# Patient Record
Sex: FEMALE | Race: WHITE | Employment: FULL TIME | URBAN - METROPOLITAN AREA
[De-identification: names, ages, dates, MRNs, and addresses within clinical notes are randomized per-mention and may not be internally consistent; named-entity substitution may affect disease eponyms.]

---

## 2017-02-22 PROBLEM — R32 INCONTINENCE: Status: ACTIVE | Noted: 2017-02-22

## 2017-02-22 PROBLEM — R30.0 DYSURIA: Status: ACTIVE | Noted: 2017-02-22

## 2017-05-26 ENCOUNTER — HOSPITAL ENCOUNTER (OUTPATIENT)
Dept: PHYSICAL THERAPY | Age: 57
Discharge: HOME | End: 2017-05-26
Attending: UROLOGY
Payer: COMMERCIAL

## 2017-05-26 DIAGNOSIS — N39.3 SUI (STRESS URINARY INCONTINENCE, FEMALE): ICD-10-CM

## 2017-05-26 PROCEDURE — 97161 PT EVAL LOW COMPLEX 20 MIN: CPT

## 2017-05-26 PROCEDURE — G8987 SELF CARE CURRENT STATUS: HCPCS

## 2017-05-26 PROCEDURE — G8988 SELF CARE GOAL STATUS: HCPCS

## 2017-05-26 NOTE — PROGRESS NOTES
Ambulatory/Rehab Services H2 Model Falls Risk Assessment    Risk Factor Pts. ·   Confusion/Disorientation/Impulsivity  []    4 ·   Symptomatic Depression  []   2 ·   Altered Elimination  []   1 ·   Dizziness/Vertigo  []   1 ·   Gender (Male)  []   1 ·   Any administered antiepileptics (anticonvulsants):  []   2 ·   Any administered benzodiazepines:  []   1 ·   Visual Impairment (specify):  []   1 ·   Portable Oxygen Use  []   1 ·   Orthostatic ? BP  []   1 ·   History of Recent Falls (within 3 mos.)  []   5     Ability to Rise from Chair (choose one) Pts. ·   Ability to rise in a single movement  [x]   0 ·   Pushes up, successful in one attempt  []   1 ·   Multiple attempts, but successful  []   3 ·   Unable to rise without assistance  []   4   Total: (5 or greater = High Risk) 0     Falls Prevention Plan:   []                Physical Limitations to Exercise (specify):   []                Mobility Assistance Device (type):   []                Exercise/Equipment Adaptation (specify):    ©2010 [facility] [address] Patent #[id number].  Federal Law prohibits the replication, distribution or use without written permission from [facility] [employer]

## 2017-05-26 NOTE — PROGRESS NOTES
Nicky Officer  : 1960 [facility] at [address], [address] [place] [address],[address], [facility] [address].  [phone]   [phone]          OUTPATIENT PHYSICAL THERAPY:Initial Assessment 2017    ICD-10: Treatment Diagnosis: Stress Incontinence (N39.3)  Precautions/Allergies:   Review of patient's allergies indicates no known allergies. Fall Risk Score: 0 (? 5 = High Risk)  MD Orders: Evaluate and treat MEDICAL/REFERRING DIAGNOSIS:  JONATHAN (stress urinary incontinence, female) [N39.3]   DATE OF ONSET: years but worsened within past 6 mos  REFERRING PHYSICIAN: Norma Beck MD  RETURN PHYSICIAN APPOINTMENT: unknown     INITIAL ASSESSMENT:  Ms. Meeta Valera presents with decreased strength, endurance, and coordination of PFmm as noted with MMT. Uterine wall laxity likely contributing to active insufficiency of PFmm and ultimately decreased strength. She initially compensated with glut accessory muscles but much improved end of session. Pt will benefit from strengthening and functional training to address stated problems. Plan of care was discussed and agreed upon with patient and HEP was initiated. Thank you for the opportunity to work with this patient. PROBLEM LIST (Impacting functional limitations):  1. Decreased Strength  2. Decreased strength of pelvic floor which limits bladder control INTERVENTIONS PLANNED:  1. Neuromuscular re-education  2. Biofeedback as needed  3. HEP  4. Bladder retraining  5. Bladder education  6. Manual Therapy  7. Therapeutic Activites  8. Therapeutic Exercise/Strengthening   TREATMENT PLAN:  Effective Dates: 17 TO 17. Frequency/Duration: 1 time a week for 12 weeks  GOALS: (Goals have been discussed and agreed upon with patient.)  Short-Term Functional Goals: Time Frame: 2 weeks  1.  Patient will demonstrate independence with home exercise program.  2. Pt will report consistent use of the 'knack' when performing problem motions to work towards decreased incidence of incontinent episodes. Discharge Goals: Time Frame: 12 weeks  1. Pt will score 15% on PFIQ7 for overall functional improvement. 2. Pt will demonstrate improvement in strength to 3 and a hold of 10 sec for hypertrophy of PFmm and decreased incontinent episodes  3. Pt will report >= 50% improvement in incontinent episodes for ability to return to exercising  4. Pt will report decreased pad usage to 1ppd for decreased social anxiety  Rehabilitation Potential For Stated Goals: Good  Regarding Manan Current therapy, I certify that the treatment plan above will be carried out by a therapist or under their direction. Thank you for this referral,  Lakhwinder Young PT     Referring Physician Signature: Ventura Oneal MD              Date                    The information in this section was collected on 17  (except where otherwise noted). HISTORY:     History of Present Injury/Illness (Reason for Referral):  4 year history feels she got hurt at the gym. Circuit training class lifting wts over head and experienced sudden leakage of urine. Dr. Frank Ross dx with bladder drop. States symptoms come and go. She has stopped exercising and dancing d/t leaking. She does report increased incidence of constipation since injury and leaking is worse during these times. Past Medical History/Comorbidities:   Ms. Rory Abebe  has a past medical history of Essential hypertension, benign (2015); HSV (herpes simplex virus) anogenital infection; Incontinence without sensory awareness; and Mixed hyperlipidemia (2015). She also has no past medical history of Difficult intubation; Malignant hyperthermia due to anesthesia; or Pseudocholinesterase deficiency. Ms. Rory Abebe  has a past surgical history that includes  section; tonsillectomy; orthopaedic; and colonoscopy ().   Prior Level of Function/Work/Activity:  Would like to get back to the gym,   Pt is a  Previous Treatment Approaches:          none  Personal Factors:          Sex:  female        Age:  61 y.o. Current Medications:    Current Outpatient Prescriptions:     hydroCHLOROthiazide (MICROZIDE) 12.5 mg capsule, Take 1 Cap by mouth daily. , Disp: 90 Cap, Rfl: 3    scopolamine (TRANSDERM-SCOP) 1.5 mg (1 mg over 3 days) pt3d, 1 Patch by TransDERmal route every seventy-two (72) hours. , Disp: 4 Patch, Rfl: 0    Mth-Me Blue-Sod Phos-PhSal-Hyo (URIBEL) 118-10-40.8-36 mg cap capsule, Take 1 Cap by mouth four (4) times daily. , Disp: 30 Cap, Rfl: 1    ciprofloxacin HCl (CIPRO) 250 mg tablet, Take 1 Tab by mouth two (2) times a day., Disp: 10 Tab, Rfl: 1    BORIC ACID VAGINAL SUPPOSITORIES, Insert 400 mg into vagina daily. , Disp: 14 Suppository, Rfl: 6    potassium chloride SA (MICRO-K) 10 mEq capsule, Take 1 Cap by mouth daily. , Disp: 30 Cap, Rfl: 12    cetirizine (ZYRTEC) 10 mg tablet, Take 1 Tab by mouth nightly., Disp: 30 Tab, Rfl: 0   Date Last Reviewed:  05/27/17   Gynecological History:   · Number of pregnancies: 3, vaginal 2, C-sections 2  · Weight gain: no  · Episiotomy: unknown  Past Urinary Medical History:    · History of UTI, Menopause: yes every few months;  2 yrs ago  · Previous Treatments:   Incontinence History:  PROBLEM: YES/NO: COMMENTS:   Loss of urine with coughing YES    Loss of urine with lifting  YES    Loss of urine with exercise, running YES    Loss of urine with strong urge NO    Loss of urine with approaching the bathroom NO    Loss of urine with key in lock NO    Loss of urine as getting to toilet/removing clothing NO    Loss of urine when hearing running water NO    Have difficulty initiating a urine stream NO    Have difficulty stopping urine stream NO    Have to strain to empty bladder NO    Dribble urine when urinating NO    Dribble urine after emptying bladder NO    Experience pain with urination NO    Experience burning during urination NO    Have blood in urine NO Voiding Patterns:  Patient voids every 2 hours during the day and 0 times during the night. Patient reports that she empties bladder fully. Patient uses pads for bladder protection; she changes pads 1-4 times per day. Usually can handle 1-2ppd if not active. Fluid Intake:  Patient drinks 7 cups of fluid per day. She consumes 2 cups of caffeinated beverages per day. Patient does not limit fluid intake to control bladder. Bowel Habits:  Patient demonstrates normal bowel habits. Mobility / Self Care: independent  Personal / Social History:  · Sexually active? YES:   · Social activities restricted due to urinary incontinence? NO:   n      Number of Personal Factors/Comorbidities that affect the Plan of Care: 1-2: MODERATE COMPLEXITY   EXAMINATION:   External Observation:   · Voluntary Contraction: present  · Voluntary Relaxation: present  · Involuntary Contraction: delayed  · Involuntary Relaxation: present  · Perineal Body Assessment: WNL  · Anal [place]: NT  · Skin Integrity: normal  · Q-tip Test: normal  · Vaginal Vault Size: increased    [place] PERFECT (Power/Endurnace/Repetitions/Fast Twitch/Elevation/Co-contraction/Timing) Scale:   · [place] PERFECT = 2+/3/6/9//  · Tissue support test with bearing down:  Grade 1: not visible at introitus; Grade 2: visible at introitus; Grade 3: tissue outside introitus  · Anterior Wall = 2   · Posterior Wall = 2   · Apical = 1   · Palpation:  No tenderness noted   Right Left   Bulbocavernosus     Ischocavernosus     Superficial Transverse Perineal     Sphincter Urethrae     Compressor Urethra      Urethra-vaginalis     Deep Transverse Perineium     Obturator Internus     Iliococcygeus     Coccygeus     Pubococcygeus     Levator Ani     Adductor     Psoas           Body Structures Involved:  1. Nerves  2. Muscles  3. Ligaments Body Functions Affected:  1. Mental  2. Sensory/Pain  3. Genitourinary  4. Neuromusculoskeletal  5. Movement Related  6.  Skin Related Activities and Participation Affected:  1. Learning and Applying Knowledge  2. Mobility  3. Self Care  4. Interpersonal Interactions and Relationships  5. Community, Social and [place]   Number of elements that affect the Plan of Care: 1-2: LOW COMPLEXITY   CLINICAL PRESENTATION:   Presentation: Stable and uncomplicated: LOW COMPLEXITY   CLINICAL DECISION MAKING:   Outcome Measure: Tool Used: Pelvic Floor Impact Questionnaire--short form 7 (PFIQ-7)   Score:  Initial: 23.81 / 7/94%  · Bladder or Urine: 23.81  · Bowel or Rectum: 0  · Vagina or Pelvis: 0 Most Recent: X (Date: -- )  · Bladder or Urine: X  · Bowel or Rectum: X  · Vagina or Pelvis: X   Interpretation of Score: Each of the 7 sections is scored on a scale from 0-3; 0 representing \"Not at all\", 3 representing \"Quite a bit\". The mean value is taken from all the answered items, then multiplied by 100 to obtain the scale score, ranging from 0-100. This process is repeated for each column representing bowel, bladder, and pelvic pain. ? Self Care:     - CURRENT STATUS: CJ - 20%-39% impaired, limited or restricted    - GOAL STATUS: CI - 1%-19% impaired, limited or restricted    - D/C STATUS:  ---------------To be determined---------------     Medical Necessity:   · Patient demonstrates good rehab potential due to higher previous functional level. Reason for Services/Other Comments:  · Patient continues to require skilled intervention due to ongoing goals noted above.    Use of outcome tool(s) and clinical judgement create a POC that gives a: Clear prediction of patient's progress: LOW COMPLEXITY   TREATMENT:   (In addition to Assessment/Re-Assessment sessions the following treatments were rendered)  Pre-treatment Symptoms/Complaints:  States she has had to give up a lot of social and exercise activities d/t incontinence  Pain: Initial:   Pain Intensity 1: 0 0 Post Session:  0     THERAPEUTIC EXERCISE: (  minutes):  Exercises per grid below to improve strength and coordination. Required minimal verbal and tactile cues to promote proper body mechanics and promote proper body breathing techniques. Progressed resistance and repetitions as indicated. Date:   Date:   Date:     Activity/Exercise Parameters Parameters Parameters                                                  Exercises:  Patient instructed in pelvic floor exercises listed below:  5 sec hold 10x  TID  Also discussed performing knack during problem movements  The following educational topics were reviewed with patient:  Bladder health, tips to control urge, bladder diary, pelvic floor anatomy, how foods affect bladder, bladder retraining. Treatment/Session Assessment:    · Response to Treatment:  good  · Compliance with Program/Exercises: Will assess as treatment progresses. · Recommendations/Intent for next treatment session: \"Next visit will focus on advancements to more challenging activities\".   Total Treatment Duration:  PT Patient Time In/Time Out  Time In: 1500  Time Out: 1600    Abena Pathak PT

## 2017-05-30 ENCOUNTER — HOSPITAL ENCOUNTER (OUTPATIENT)
Dept: PHYSICAL THERAPY | Age: 57
Discharge: HOME | End: 2017-05-30
Payer: COMMERCIAL

## 2017-05-30 PROCEDURE — 97110 THERAPEUTIC EXERCISES: CPT

## 2017-05-30 NOTE — PROGRESS NOTES
[facility]  : 1960 Therapy Center at [address], [address] [place] [address],[address], [facility] [address].  [phone]   [phone]          OUTPATIENT PHYSICAL THERAPY:Daily Note 2017    ICD-10: Treatment Diagnosis: Stress Incontinence (N39.3)  Precautions/Allergies:   Review of patient's allergies indicates no known allergies. Fall Risk Score: 0 (? 5 = High Risk)  MD Orders: Evaluate and treat MEDICAL/REFERRING DIAGNOSIS:  JONATHAN (stress urinary incontinence, female) [N39.3]   DATE OF ONSET: years but worsened within past 6 mos  REFERRING PHYSICIAN: Tisha Muller MD  RETURN PHYSICIAN APPOINTMENT: unknown     INITIAL ASSESSMENT:  Ms. Eddy Zamora presents with decreased strength, endurance, and coordination of PFmm as noted with MMT. Uterine wall laxity likely contributing to active insufficiency of PFmm and ultimately decreased strength. She initially compensated with glut accessory muscles but much improved end of session. Pt will benefit from strengthening and functional training to address stated problems. Plan of care was discussed and agreed upon with patient and HEP was initiated. Thank you for the opportunity to work with this patient. PROBLEM LIST (Impacting functional limitations):  1. Decreased Strength  2. Decreased strength of pelvic floor which limits bladder control INTERVENTIONS PLANNED:  1. Neuromuscular re-education  2. Biofeedback as needed  3. HEP  4. Bladder retraining  5. Bladder education  6. Manual Therapy  7. Therapeutic Activites  8. Therapeutic Exercise/Strengthening   TREATMENT PLAN:  Effective Dates: 17 TO 17. Frequency/Duration: 1 time a week for 12 weeks  GOALS: (Goals have been discussed and agreed upon with patient.)  Short-Term Functional Goals: Time Frame: 2 weeks  1.  Patient will demonstrate independence with home exercise program.  2. Pt will report consistent use of the 'knack' when performing problem motions to work towards decreased incidence of incontinent episodes. Discharge Goals: Time Frame: 12 weeks  1. Pt will score 15% on PFIQ7 for overall functional improvement. 2. Pt will demonstrate improvement in strength to 3 and a hold of 10 sec for hypertrophy of PFmm and decreased incontinent episodes  3. Pt will report >= 50% improvement in incontinent episodes for ability to return to exercising  4. Pt will report decreased pad usage to 1ppd for decreased social anxiety  Rehabilitation Potential For Stated Goals: Good  Regarding Senthil Bell therapy, I certify that the treatment plan above will be carried out by a therapist or under their direction. Thank you for this referral,  Adrian Todd PT     Referring Physician Signature: Vanessa Mcnamara MD              Date                    The information in this section was collected on 17  (except where otherwise noted). HISTORY:     History of Present Injury/Illness (Reason for Referral):  4 year history feels she got hurt at the gym. Circuit training class lifting wts over head and experienced sudden leakage of urine. Dr. Leos Mew dx with bladder drop. States symptoms come and go. She has stopped exercising and dancing d/t leaking. She does report increased incidence of constipation since injury and leaking is worse during these times. Past Medical History/Comorbidities:   Ms. Sabas Kenyon  has a past medical history of Essential hypertension, benign (2015); HSV (herpes simplex virus) anogenital infection; Incontinence without sensory awareness; and Mixed hyperlipidemia (2015). She also has no past medical history of Difficult intubation; Malignant hyperthermia due to anesthesia; or Pseudocholinesterase deficiency. Ms. Sabas Kenyon  has a past surgical history that includes  section; tonsillectomy; orthopaedic; and colonoscopy ().   Prior Level of Function/Work/Activity:  Would like to get back to the gym,   Pt is a    Previous Treatment Approaches:          none  Personal Factors:          Sex:  female        Age:  61 y.o. Current Medications:    Current Outpatient Prescriptions:     hydroCHLOROthiazide (MICROZIDE) 12.5 mg capsule, Take 1 Cap by mouth daily. , Disp: 90 Cap, Rfl: 3    scopolamine (TRANSDERM-SCOP) 1.5 mg (1 mg over 3 days) pt3d, 1 Patch by TransDERmal route every seventy-two (72) hours. , Disp: 4 Patch, Rfl: 0    Mth-Me Blue-Sod Phos-PhSal-Hyo (URIBEL) 118-10-40.8-36 mg cap capsule, Take 1 Cap by mouth four (4) times daily. , Disp: 30 Cap, Rfl: 1    ciprofloxacin HCl (CIPRO) 250 mg tablet, Take 1 Tab by mouth two (2) times a day., Disp: 10 Tab, Rfl: 1    BORIC ACID VAGINAL SUPPOSITORIES, Insert 400 mg into vagina daily. , Disp: 14 Suppository, Rfl: 6    potassium chloride SA (MICRO-K) 10 mEq capsule, Take 1 Cap by mouth daily. , Disp: 30 Cap, Rfl: 12    cetirizine (ZYRTEC) 10 mg tablet, Take 1 Tab by mouth nightly., Disp: 30 Tab, Rfl: 0   Date Last Reviewed:  05/30/17   Gynecological History:   · Number of pregnancies: 3, vaginal 2, C-sections 2  · Weight gain: no  · Episiotomy: unknown  Past Urinary Medical History:    · History of UTI, Menopause: yes every few months;  2 yrs ago  · Previous Treatments:   Incontinence History:  PROBLEM: YES/NO: COMMENTS:   Loss of urine with coughing YES    Loss of urine with lifting  YES    Loss of urine with exercise, running YES    Loss of urine with strong urge NO    Loss of urine with approaching the bathroom NO    Loss of urine with key in lock NO    Loss of urine as getting to toilet/removing clothing NO    Loss of urine when hearing running water NO    Have difficulty initiating a urine stream NO    Have difficulty stopping urine stream NO    Have to strain to empty bladder NO    Dribble urine when urinating NO    Dribble urine after emptying bladder NO    Experience pain with urination NO    Experience burning during urination NO    Have blood in urine NO      Voiding Patterns:  Patient voids every 2 hours during the day and 0 times during the night. Patient reports that she empties bladder fully. Patient uses pads for bladder protection; she changes pads 1-4 times per day. Usually can handle 1-2ppd if not active. Fluid Intake:  Patient drinks 7 cups of fluid per day. She consumes 2 cups of caffeinated beverages per day. Patient does not limit fluid intake to control bladder. Bowel Habits:  Patient demonstrates normal bowel habits. Mobility / Self Care: independent  Personal / Social History:  · Sexually active? YES:   · Social activities restricted due to urinary incontinence? NO:   n      Number of Personal Factors/Comorbidities that affect the Plan of Care: 1-2: MODERATE COMPLEXITY   EXAMINATION:   External Observation:   · Voluntary Contraction: present  · Voluntary Relaxation: present  · Involuntary Contraction: delayed  · Involuntary Relaxation: present  · Perineal Body Assessment: WNL  · Anal [place]: NT  · Skin Integrity: normal  · Q-tip Test: normal  · Vaginal Vault Size: increased    [place] PERFECT (Power/Endurnace/Repetitions/Fast Twitch/Elevation/Co-contraction/Timing) Scale:   · [place] PERFECT = 2+/3/6/9//  · Tissue support test with bearing down:  Grade 1: not visible at introitus; Grade 2: visible at introitus; Grade 3: tissue outside introitus  · Anterior Wall = 2   · Posterior Wall = 2   · Apical = 1   · Palpation:  No tenderness noted   Right Left   Bulbocavernosus     Ischocavernosus     Superficial Transverse Perineal     Sphincter Urethrae     Compressor Urethra      Urethra-vaginalis     Deep Transverse Perineium     Obturator Internus     Iliococcygeus     Coccygeus     Pubococcygeus     Levator Ani     Adductor     Psoas           Body Structures Involved:  1. Nerves  2. Muscles  3. Ligaments Body Functions Affected:  1. Mental  2. Sensory/Pain  3. Genitourinary  4. Neuromusculoskeletal  5. Movement Related  6.  Skin Related Activities and Participation Affected:  1. Learning and Applying Knowledge  2. Mobility  3. Self Care  4. Interpersonal Interactions and Relationships  5. Community, Social and [place]   Number of elements that affect the Plan of Care: 1-2: LOW COMPLEXITY   CLINICAL PRESENTATION:   Presentation: Stable and uncomplicated: LOW COMPLEXITY   CLINICAL DECISION MAKING:   Outcome Measure: Tool Used: Pelvic Floor Impact Questionnaire--short form 7 (PFIQ-7)   Score:  Initial: 23.81 / 7/94%  · Bladder or Urine: 23.81  · Bowel or Rectum: 0  · Vagina or Pelvis: 0 Most Recent: X (Date: -- )  · Bladder or Urine: X  · Bowel or Rectum: X  · Vagina or Pelvis: X   Interpretation of Score: Each of the 7 sections is scored on a scale from 0-3; 0 representing \"Not at all\", 3 representing \"Quite a bit\". The mean value is taken from all the answered items, then multiplied by 100 to obtain the scale score, ranging from 0-100. This process is repeated for each column representing bowel, bladder, and pelvic pain. ? Self Care:     - CURRENT STATUS: CJ - 20%-39% impaired, limited or restricted    - GOAL STATUS: CI - 1%-19% impaired, limited or restricted    - D/C STATUS:  ---------------To be determined---------------     Medical Necessity:   · Patient demonstrates good rehab potential due to higher previous functional level. Reason for Services/Other Comments:  · Patient continues to require skilled intervention due to ongoing goals noted above. Use of outcome tool(s) and clinical judgement create a POC that gives a: Clear prediction of patient's progress: LOW COMPLEXITY   TREATMENT:   (In addition to Assessment/Re-Assessment sessions the following treatments were rendered)  Pre-treatment Symptoms/Complaints:  States she did a lot of painting over the weekend. Did not leak as bad as she thought she would but did leak a lot when she did prolonged walking.     Pain: Initial:   Pain Intensity 1: 0 0 Post Session:  0     THERAPEUTIC EXERCISE: ( 55 minutes):  Exercises per grid below to improve strength and coordination. Required minimal verbal and tactile cues to promote proper body mechanics and promote proper body breathing techniques. Progressed resistance and repetitions as indicated. Date:  5/30/17 Date:   Date:     Activity/Exercise Parameters Parameters Parameters   Isometric PFmm w biofeedback See below     Bridge with ball squeeze      Bridge with isometric ER      Sidelying clams with TB      sidestepping      squats             biofeedback utilized 10/10, 5/10, 2/4, modulation for improvement in strength, endurance, coordination, and decreased use of accessory muscles  NMES used x10'. 50Hz, 10/10 for hypertrophy of PFmm and improvement in strength    Exercises:  Patient instructed in pelvic floor exercises listed below:  5 sec hold 10x  TID  Also discussed performing knack during problem movements  The following educational topics were reviewed with patient:  Bladder health, tips to control urge, bladder diary, pelvic floor anatomy, how foods affect bladder, bladder retraining. Treatment/Session Assessment:    · Response to Treatment:  good  · Compliance with Program/Exercises: Will assess as treatment progresses. · Recommendations/Intent for next treatment session: \"Next visit will focus on advancements to more challenging activities\".   Total Treatment Duration:  PT Patient Time In/Time Out  Time In: 1500  Time Out: 1600    Jacy Koch PT

## 2017-06-07 ENCOUNTER — HOSPITAL ENCOUNTER (OUTPATIENT)
Dept: PHYSICAL THERAPY | Age: 57
Discharge: HOME | End: 2017-06-07
Payer: COMMERCIAL

## 2017-06-07 PROCEDURE — 97140 MANUAL THERAPY 1/> REGIONS: CPT

## 2017-06-07 PROCEDURE — 97110 THERAPEUTIC EXERCISES: CPT

## 2017-06-07 NOTE — PROGRESS NOTES
Jose Cooper  : 1960 [facility]  [address] [facility]  [phone]   [phone]          OUTPATIENT PHYSICAL THERAPY:Daily Note 2017    ICD-10: Treatment Diagnosis: Stress Incontinence (N39.3)  Precautions/Allergies:   Review of patient's allergies indicates no known allergies. Fall Risk Score: 0 (? 5 = High Risk)  MD Orders: Evaluate and treat MEDICAL/REFERRING DIAGNOSIS:  JONATHAN (stress urinary incontinence, female) [N39.3]   DATE OF ONSET: years but worsened within past 6 mos  REFERRING PHYSICIAN: Aldo Marcelino MD  RETURN PHYSICIAN APPOINTMENT: unknown     INITIAL ASSESSMENT:  Ms. Leslie Beltre presents with decreased strength, endurance, and coordination of PFmm as noted with MMT. Uterine wall laxity likely contributing to active insufficiency of PFmm and ultimately decreased strength. She initially compensated with glut accessory muscles but much improved end of session. Pt will benefit from strengthening and functional training to address stated problems. Plan of care was discussed and agreed upon with patient and HEP was initiated. Thank you for the opportunity to work with this patient. PROBLEM LIST (Impacting functional limitations):  1. Decreased Strength  2. Decreased strength of pelvic floor which limits bladder control INTERVENTIONS PLANNED:  1. Neuromuscular re-education  2. Biofeedback as needed  3. HEP  4. Bladder retraining  5. Bladder education  6. Manual Therapy  7. Therapeutic Activites  8. Therapeutic Exercise/Strengthening   TREATMENT PLAN:  Effective Dates: 17 TO 17. Frequency/Duration: 1 time a week for 12 weeks  GOALS: (Goals have been discussed and agreed upon with patient.)  Short-Term Functional Goals: Time Frame: 2 weeks  1.  Patient will demonstrate independence with home exercise program.  2. Pt will report consistent use of the 'knack' when performing problem motions to work towards decreased incidence of incontinent episodes. Discharge Goals: Time Frame: 12 weeks  1. Pt will score 15% on PFIQ7 for overall functional improvement. 2. Pt will demonstrate improvement in strength to 3 and a hold of 10 sec for hypertrophy of PFmm and decreased incontinent episodes  3. Pt will report >= 50% improvement in incontinent episodes for ability to return to exercising  4. Pt will report decreased pad usage to 1ppd for decreased social anxiety  Rehabilitation Potential For Stated Goals: Good  Regarding Ignacio Goldstein therapy, I certify that the treatment plan above will be carried out by a therapist or under their direction. Thank you for this referral,  Dick Cueva PT     Referring Physician Signature: Marylene Mote, MD              Date                    The information in this section was collected on 17  (except where otherwise noted). HISTORY:     History of Present Injury/Illness (Reason for Referral):  4 year history feels she got hurt at the gym. Circuit training class lifting wts over head and experienced sudden leakage of urine. Dr. Shane Chin dx with bladder drop. States symptoms come and go. She has stopped exercising and dancing d/t leaking. She does report increased incidence of constipation since injury and leaking is worse during these times. Past Medical History/Comorbidities:   Ms. Donato Castellanos  has a past medical history of Essential hypertension, benign (2015); HSV (herpes simplex virus) anogenital infection; Incontinence without sensory awareness; and Mixed hyperlipidemia (2015). She also has no past medical history of Difficult intubation; Malignant hyperthermia due to anesthesia; or Pseudocholinesterase deficiency. Ms. Donato Castellanos  has a past surgical history that includes  section; tonsillectomy; orthopaedic; and colonoscopy ().   Prior Level of Function/Work/Activity:  Would like to get back to the gym,   Pt is a    Previous Treatment Approaches:          none  Personal Factors:          Sex:  female        Age:  61 y.o. Current Medications:    Current Outpatient Prescriptions:     hydroCHLOROthiazide (MICROZIDE) 12.5 mg capsule, Take 1 Cap by mouth daily. , Disp: 90 Cap, Rfl: 3    scopolamine (TRANSDERM-SCOP) 1.5 mg (1 mg over 3 days) pt3d, 1 Patch by TransDERmal route every seventy-two (72) hours. , Disp: 4 Patch, Rfl: 0    Mth-Me Blue-Sod Phos-PhSal-Hyo (URIBEL) 118-10-40.8-36 mg cap capsule, Take 1 Cap by mouth four (4) times daily. , Disp: 30 Cap, Rfl: 1    ciprofloxacin HCl (CIPRO) 250 mg tablet, Take 1 Tab by mouth two (2) times a day., Disp: 10 Tab, Rfl: 1    BORIC ACID VAGINAL SUPPOSITORIES, Insert 400 mg into vagina daily. , Disp: 14 Suppository, Rfl: 6    potassium chloride SA (MICRO-K) 10 mEq capsule, Take 1 Cap by mouth daily. , Disp: 30 Cap, Rfl: 12    cetirizine (ZYRTEC) 10 mg tablet, Take 1 Tab by mouth nightly., Disp: 30 Tab, Rfl: 0   Date Last Reviewed:  06/07/17   Gynecological History:   · Number of pregnancies: 3, vaginal 2, C-sections 2  · Weight gain: no  · Episiotomy: unknown  Past Urinary Medical History:    · History of UTI, Menopause: yes every few months;  2 yrs ago  · Previous Treatments:   Incontinence History:  PROBLEM: YES/NO: COMMENTS:   Loss of urine with coughing YES    Loss of urine with lifting  YES    Loss of urine with exercise, running YES    Loss of urine with strong urge NO    Loss of urine with approaching the bathroom NO    Loss of urine with key in lock NO    Loss of urine as getting to toilet/removing clothing NO    Loss of urine when hearing running water NO    Have difficulty initiating a urine stream NO    Have difficulty stopping urine stream NO    Have to strain to empty bladder NO    Dribble urine when urinating NO    Dribble urine after emptying bladder NO    Experience pain with urination NO    Experience burning during urination NO    Have blood in urine NO      Voiding Patterns:  Patient voids every 2 hours during the day and 0 times during the night. Patient reports that she empties bladder fully. Patient uses pads for bladder protection; she changes pads 1-4 times per day. Usually can handle 1-2ppd if not active. Fluid Intake:  Patient drinks 7 cups of fluid per day. She consumes 2 cups of caffeinated beverages per day. Patient does not limit fluid intake to control bladder. Bowel Habits:  Patient demonstrates normal bowel habits. Mobility / Self Care: independent  Personal / Social History:  · Sexually active? YES:   · Social activities restricted due to urinary incontinence? NO:   n      Number of Personal Factors/Comorbidities that affect the Plan of Care: 1-2: MODERATE COMPLEXITY   EXAMINATION:   External Observation:   · Voluntary Contraction: present  · Voluntary Relaxation: present  · Involuntary Contraction: delayed  · Involuntary Relaxation: present  · Perineal Body Assessment: WNL  · Anal [place]: NT  · Skin Integrity: normal  · Q-tip Test: normal  · Vaginal Vault Size: increased    [place] PERFECT (Power/Endurnace/Repetitions/Fast Twitch/Elevation/Co-contraction/Timing) Scale:   · [place] PERFECT = 2+/3/6/9//  · Tissue support test with bearing down:  Grade 1: not visible at introitus; Grade 2: visible at introitus; Grade 3: tissue outside introitus  · Anterior Wall = 2   · Posterior Wall = 2   · Apical = 1   · Palpation:  No tenderness noted   Right Left   Bulbocavernosus     Ischocavernosus     Superficial Transverse Perineal     Sphincter Urethrae     Compressor Urethra      Urethra-vaginalis     Deep Transverse Perineium     Obturator Internus     Iliococcygeus     Coccygeus     Pubococcygeus     Levator Ani     Adductor     Psoas     6/7/17 - L PSIS higher,  LLE longer  Long sit test - LLE became longer      Body Structures Involved:  1. Nerves  2. Muscles  3.  Ligaments Body Functions Affected:  1. Mental  2. Sensory/Pain  3. Genitourinary  4. Neuromusculoskeletal  5. Movement Related  6. Skin Related Activities and Participation Affected:  1. Learning and Applying Knowledge  2. Mobility  3. Self Care  4. Interpersonal Interactions and Relationships  5. Community, Social and [place]   Number of elements that affect the Plan of Care: 1-2: LOW COMPLEXITY   CLINICAL PRESENTATION:   Presentation: Stable and uncomplicated: LOW COMPLEXITY   CLINICAL DECISION MAKING:   Outcome Measure: Tool Used: Pelvic Floor Impact Questionnaire--short form 7 (PFIQ-7)   Score:  Initial: 23.81 / 7/94%  · Bladder or Urine: 23.81  · Bowel or Rectum: 0  · Vagina or Pelvis: 0 Most Recent: X (Date: -- )  · Bladder or Urine: X  · Bowel or Rectum: X  · Vagina or Pelvis: X   Interpretation of Score: Each of the 7 sections is scored on a scale from 0-3; 0 representing \"Not at all\", 3 representing \"Quite a bit\". The mean value is taken from all the answered items, then multiplied by 100 to obtain the scale score, ranging from 0-100. This process is repeated for each column representing bowel, bladder, and pelvic pain. ? Self Care:     - CURRENT STATUS: CJ - 20%-39% impaired, limited or restricted    - GOAL STATUS: CI - 1%-19% impaired, limited or restricted    - D/C STATUS:  ---------------To be determined---------------     Medical Necessity:   · Patient demonstrates good rehab potential due to higher previous functional level. Reason for Services/Other Comments:  · Patient continues to require skilled intervention due to ongoing goals noted above. Use of outcome tool(s) and clinical judgement create a POC that gives a: Clear prediction of patient's progress: LOW COMPLEXITY   TREATMENT:   (In addition to Assessment/Re-Assessment sessions the following treatments were rendered)  Pre-treatment Symptoms/Complaints: Pt states she varies from 1-4ppd depending on activity level.   She feels she also goes in waves in terms of leaking and feels that even though she no longer gets her period she feels there is some hormone component. [id number]   Pain: Initial:   Pain Intensity 1: 0 0 Post Session:  0     THERAPEUTIC EXERCISE: (45  minutes):  Exercises per grid below to improve strength and coordination. Required minimal verbal and tactile cues to promote proper body mechanics and promote proper body breathing techniques. Progressed resistance and repetitions as indicated. Date:  5/30/17 Date:  6/7/17 Date:     Activity/Exercise Parameters Parameters Parameters   Isometric PFmm w biofeedback See below See below    Bridge with ball squeeze      Bridge with isometric ER      Sidelying clams with TB      sidestepping      squats             biofeedback utilized 10/10, 5/10, 2/4, modulation for improvement in strength, endurance, coordination, and decreased use of accessory muscles  NMES used x10'. 50Hz, 10/10 for hypertrophy of PFmm and improvement in strength    Manual Therapy  (15') - correct left anterior rotated inominate. Exercises:  Patient instructed in pelvic floor exercises listed below:  5 sec hold 10x  TID  Also discussed performing knack during problem movements  The following educational topics were reviewed with patient:  Bladder health, tips to control urge, bladder diary, pelvic floor anatomy, how foods affect bladder, bladder retraining. Treatment/Session Assessment:  Tolerated all well. Noted mild pelvic obliquity likely affecting PFmm ability to contract efficiently. High resting tone of PFmm in standing and with knees in hooklying. Able to partially correct SI - reassess next visit. Progress to adding larger muscle group ex's next visit  · Response to Treatment:  good  · Compliance with Program/Exercises: Will assess as treatment progresses. · Recommendations/Intent for next treatment session: \"Next visit will focus on advancements to more challenging activities\".   Total Treatment Duration:  PT Patient Time In/Time Out  Time In: 1300  Time Out: DOUG Francisco 20 Jakub Orantes, PT

## 2017-06-14 ENCOUNTER — HOSPITAL ENCOUNTER (OUTPATIENT)
Dept: PHYSICAL THERAPY | Age: 57
Discharge: HOME | End: 2017-06-14
Payer: COMMERCIAL

## 2017-06-14 PROCEDURE — 97110 THERAPEUTIC EXERCISES: CPT

## 2017-06-14 PROCEDURE — 97140 MANUAL THERAPY 1/> REGIONS: CPT

## 2017-06-14 NOTE — PROGRESS NOTES
Skylar Rodrigez  : 1960 [facility] at [facility]  [address], [address] [address], [facility] [address]  [phone]   [phone]          OUTPATIENT PHYSICAL THERAPY:Daily Note 2017    ICD-10: Treatment Diagnosis: Stress Incontinence (N39.3)  Precautions/Allergies:   Review of patient's allergies indicates no known allergies. Fall Risk Score: 0 (? 5 = High Risk)  MD Orders: Evaluate and treat MEDICAL/REFERRING DIAGNOSIS:  JONATHAN (stress urinary incontinence, female) [N39.3]   DATE OF ONSET: years but worsened within past 6 mos  REFERRING PHYSICIAN: Sara Khan MD  RETURN PHYSICIAN APPOINTMENT: unknown     INITIAL ASSESSMENT:  Ms. Neil Winkler presents with decreased strength, endurance, and coordination of PFmm as noted with MMT. Uterine wall laxity likely contributing to active insufficiency of PFmm and ultimately decreased strength. She initially compensated with glut accessory muscles but much improved end of session. Pt will benefit from strengthening and functional training to address stated problems. Plan of care was discussed and agreed upon with patient and HEP was initiated. Thank you for the opportunity to work with this patient. PROBLEM LIST (Impacting functional limitations):  1. Decreased Strength  2. Decreased strength of pelvic floor which limits bladder control INTERVENTIONS PLANNED:  1. Neuromuscular re-education  2. Biofeedback as needed  3. HEP  4. Bladder retraining  5. Bladder education  6. Manual Therapy  7. Therapeutic Activites  8. Therapeutic Exercise/Strengthening   TREATMENT PLAN:  Effective Dates: 17 TO 17. Frequency/Duration: 1 time a week for 12 weeks  GOALS: (Goals have been discussed and agreed upon with patient.)  Short-Term Functional Goals: Time Frame: 2 weeks  1.  Patient will demonstrate independence with home exercise program.  2. Pt will report consistent use of the 'knack' when performing problem motions to work towards decreased incidence of incontinent episodes. Discharge Goals: Time Frame: 12 weeks  1. Pt will score 15% on PFIQ7 for overall functional improvement. 2. Pt will demonstrate improvement in strength to 3 and a hold of 10 sec for hypertrophy of PFmm and decreased incontinent episodes  3. Pt will report >= 50% improvement in incontinent episodes for ability to return to exercising  4. Pt will report decreased pad usage to 1ppd for decreased social anxiety  Rehabilitation Potential For Stated Goals: Good  Regarding Senthil Bell therapy, I certify that the treatment plan above will be carried out by a therapist or under their direction. Thank you for this referral,  Adrian Todd PT     Referring Physician Signature: Vanessa Mcnamara MD              Date                    The information in this section was collected on 17  (except where otherwise noted). HISTORY:     History of Present Injury/Illness (Reason for Referral):  4 year history feels she got hurt at the gym. Circuit training class lifting wts over head and experienced sudden leakage of urine. Dr. Leos Mew dx with bladder drop. States symptoms come and go. She has stopped exercising and dancing d/t leaking. She does report increased incidence of constipation since injury and leaking is worse during these times. Past Medical History/Comorbidities:   Ms. Sabas Kenyon  has a past medical history of Essential hypertension, benign (2015); HSV (herpes simplex virus) anogenital infection; Incontinence without sensory awareness; and Mixed hyperlipidemia (2015). She also has no past medical history of Difficult intubation; Malignant hyperthermia due to anesthesia; or Pseudocholinesterase deficiency. Ms. Sabas Kenyon  has a past surgical history that includes  section; tonsillectomy; orthopaedic; and colonoscopy ().   Prior Level of Function/Work/Activity:  Would like to get back to the gym,   Pt is a    Previous Treatment Approaches:          none  Personal Factors:          Sex:  female        Age:  61 y.o. Current Medications:    Current Outpatient Prescriptions:     hydroCHLOROthiazide (MICROZIDE) 12.5 mg capsule, Take 1 Cap by mouth daily. , Disp: 90 Cap, Rfl: 3    scopolamine (TRANSDERM-SCOP) 1.5 mg (1 mg over 3 days) pt3d, 1 Patch by TransDERmal route every seventy-two (72) hours. , Disp: 4 Patch, Rfl: 0    Mth-Me Blue-Sod Phos-PhSal-Hyo (URIBEL) 118-10-40.8-36 mg cap capsule, Take 1 Cap by mouth four (4) times daily. , Disp: 30 Cap, Rfl: 1    ciprofloxacin HCl (CIPRO) 250 mg tablet, Take 1 Tab by mouth two (2) times a day., Disp: 10 Tab, Rfl: 1    BORIC ACID VAGINAL SUPPOSITORIES, Insert 400 mg into vagina daily. , Disp: 14 Suppository, Rfl: 6    potassium chloride SA (MICRO-K) 10 mEq capsule, Take 1 Cap by mouth daily. , Disp: 30 Cap, Rfl: 12    cetirizine (ZYRTEC) 10 mg tablet, Take 1 Tab by mouth nightly., Disp: 30 Tab, Rfl: 0   Date Last Reviewed:  06/14/17   Gynecological History:   · Number of pregnancies: 3, vaginal 2, C-sections 2  · Weight gain: no  · Episiotomy: unknown  Past Urinary Medical History:    · History of UTI, Menopause: yes every few months;  2 yrs ago  · Previous Treatments:   Incontinence History:  PROBLEM: YES/NO: COMMENTS:   Loss of urine with coughing YES    Loss of urine with lifting  YES    Loss of urine with exercise, running YES    Loss of urine with strong urge NO    Loss of urine with approaching the bathroom NO    Loss of urine with key in lock NO    Loss of urine as getting to toilet/removing clothing NO    Loss of urine when hearing running water NO    Have difficulty initiating a urine stream NO    Have difficulty stopping urine stream NO    Have to strain to empty bladder NO    Dribble urine when urinating NO    Dribble urine after emptying bladder NO    Experience pain with urination NO    Experience burning during urination NO    Have blood in urine NO      Voiding Patterns:  Patient voids every 2 hours during the day and 0 times during the night. Patient reports that she empties bladder fully. Patient uses pads for bladder protection; she changes pads 1-4 times per day. Usually can handle 1-2ppd if not active. Fluid Intake:  Patient drinks 7 cups of fluid per day. She consumes 2 cups of caffeinated beverages per day. Patient does not limit fluid intake to control bladder. Bowel Habits:  Patient demonstrates normal bowel habits. Mobility / Self Care: independent  Personal / Social History:  · Sexually active? YES:   · Social activities restricted due to urinary incontinence? NO:   n      Number of Personal Factors/Comorbidities that affect the Plan of Care: 1-2: MODERATE COMPLEXITY   EXAMINATION:   External Observation:   · Voluntary Contraction: present  · Voluntary Relaxation: present  · Involuntary Contraction: delayed  · Involuntary Relaxation: present  · Perineal Body Assessment: WNL  · Anal [place]: NT  · Skin Integrity: normal  · Q-tip Test: normal  · Vaginal Vault Size: increased    [place] PERFECT (Power/Endurnace/Repetitions/Fast Twitch/Elevation/Co-contraction/Timing) Scale:   · [place] PERFECT = 2+/3/6/9//  · Tissue support test with bearing down:  Grade 1: not visible at introitus; Grade 2: visible at introitus; Grade 3: tissue outside introitus  · Anterior Wall = 2   · Posterior Wall = 2   · Apical = 1   · Palpation:  No tenderness noted   Right Left   Bulbocavernosus     Ischocavernosus     Superficial Transverse Perineal     Sphincter Urethrae     Compressor Urethra      Urethra-vaginalis     Deep Transverse Perineium     Obturator Internus     Iliococcygeus     Coccygeus     Pubococcygeus     Levator Ani     Adductor     Psoas     6/7/17 - L PSIS higher,  LLE longer  Long sit test - LLE became longer      Body Structures Involved:  1. Nerves  2. Muscles  3.  Ligaments Body Functions Affected:  1. Mental  2. Sensory/Pain  3. Genitourinary  4. Neuromusculoskeletal  5. Movement Related  6. Skin Related Activities and Participation Affected:  1. Learning and Applying Knowledge  2. Mobility  3. Self Care  4. Interpersonal Interactions and Relationships  5. Community, Social and [place]   Number of elements that affect the Plan of Care: 1-2: LOW COMPLEXITY   CLINICAL PRESENTATION:   Presentation: Stable and uncomplicated: LOW COMPLEXITY   CLINICAL DECISION MAKING:   Outcome Measure: Tool Used: Pelvic Floor Impact Questionnaire--short form 7 (PFIQ-7)   Score:  Initial: 23.81 / 7/94%  · Bladder or Urine: 23.81  · Bowel or Rectum: 0  · Vagina or Pelvis: 0 Most Recent: X (Date: -- )  · Bladder or Urine: X  · Bowel or Rectum: X  · Vagina or Pelvis: X   Interpretation of Score: Each of the 7 sections is scored on a scale from 0-3; 0 representing \"Not at all\", 3 representing \"Quite a bit\". The mean value is taken from all the answered items, then multiplied by 100 to obtain the scale score, ranging from 0-100. This process is repeated for each column representing bowel, bladder, and pelvic pain. ? Self Care:     - CURRENT STATUS: CJ - 20%-39% impaired, limited or restricted    - GOAL STATUS: CI - 1%-19% impaired, limited or restricted    - D/C STATUS:  ---------------To be determined---------------     Medical Necessity:   · Patient demonstrates good rehab potential due to higher previous functional level. Reason for Services/Other Comments:  · Patient continues to require skilled intervention due to ongoing goals noted above.    Use of outcome tool(s) and clinical judgement create a POC that gives a: Clear prediction of patient's progress: LOW COMPLEXITY   TREATMENT:   (In addition to Assessment/Re-Assessment sessions the following treatments were rendered)  Pre-treatment Symptoms/Complaints: Pt states she has done much better this week after last visit and even went a few days without use of pads. Pain: Initial:   Pain Intensity 1: 0 0 Post Session:  0     THERAPEUTIC EXERCISE: (45  minutes):  Exercises per grid below to improve strength and coordination. Required minimal verbal and tactile cues to promote proper body mechanics and promote proper body breathing techniques. Progressed resistance and repetitions as indicated. Date:  5/30/17 Date:  6/7/17 Date:  6/14/17   Activity/Exercise Parameters Parameters Parameters   Isometric PFmm w biofeedback See below See below    Bridge with ball squeeze   10x   Bridge with isometric ER      Sidelying clams with TB   GTB 10x   sidestepping      Squats / sit to stand arms out   10x ea w extensive ed on form   Isometric TA w kegel   15' practice    biofeedback utilized 10/10, 5/10, 2/4, modulation for improvement in strength, endurance, coordination, and decreased use of accessory muscles- held   NMES used x10'. 50Hz, 10/10 for hypertrophy of PFmm and improvement in strength- held    Manual Therapy  (15') - correct left anterior rotated inominate. Exercises:  Patient instructed in pelvic floor exercises listed below:  5 sec hold 10x  TID  Also discussed performing knack during problem movements  The following educational topics were reviewed with patient:  Bladder health, tips to control urge, bladder diary, pelvic floor anatomy, how foods affect bladder, bladder retraining. Treatment/Session Assessment:  Good response to initiation of TA with ex's. Tolerated all tx well. Improving with endurance. Assess maintanence of correction next visit. Assess for progression to 10sec hold kegel next visit  · Response to Treatment:  good  · Compliance with Program/Exercises: Will assess as treatment progresses. · Recommendations/Intent for next treatment session: \"Next visit will focus on advancements to more challenging activities\".   Total Treatment Duration:  PT Patient Time In/Time Out  Time In: 1430  Time Out: 1530    Kelly Castorena PT

## 2017-06-21 ENCOUNTER — HOSPITAL ENCOUNTER (OUTPATIENT)
Dept: PHYSICAL THERAPY | Age: 57
Discharge: HOME | End: 2017-06-21
Payer: COMMERCIAL

## 2017-06-21 PROCEDURE — 97140 MANUAL THERAPY 1/> REGIONS: CPT

## 2017-06-21 PROCEDURE — 97110 THERAPEUTIC EXERCISES: CPT

## 2017-06-21 NOTE — PROGRESS NOTES
Vanessa Bartholomew  : 1960 [facility] at [address], [address] [place] [address],[address], [facility] [address].  [phone]   [phone]          OUTPATIENT PHYSICAL THERAPY:Daily Note 2017    ICD-10: Treatment Diagnosis: Stress Incontinence (N39.3)  Precautions/Allergies:   Review of patient's allergies indicates no known allergies. Fall Risk Score: 0 (? 5 = High Risk)  MD Orders: Evaluate and treat MEDICAL/REFERRING DIAGNOSIS:  JONATHAN (stress urinary incontinence, female) [N39.3]   DATE OF ONSET: years but worsened within past 6 mos  REFERRING PHYSICIAN: Marylene Mote, MD  RETURN PHYSICIAN APPOINTMENT: unknown     INITIAL ASSESSMENT:  Ms. Donato Castellanos presents with decreased strength, endurance, and coordination of PFmm as noted with MMT. Uterine wall laxity likely contributing to active insufficiency of PFmm and ultimately decreased strength. She initially compensated with glut accessory muscles but much improved end of session. Pt will benefit from strengthening and functional training to address stated problems. Plan of care was discussed and agreed upon with patient and HEP was initiated. Thank you for the opportunity to work with this patient. PROBLEM LIST (Impacting functional limitations):  1. Decreased Strength  2. Decreased strength of pelvic floor which limits bladder control INTERVENTIONS PLANNED:  1. Neuromuscular re-education  2. Biofeedback as needed  3. HEP  4. Bladder retraining  5. Bladder education  6. Manual Therapy  7. Therapeutic Activites  8. Therapeutic Exercise/Strengthening   TREATMENT PLAN:  Effective Dates: 17 TO 17. Frequency/Duration: 1 time a week for 12 weeks  GOALS: (Goals have been discussed and agreed upon with patient.)  Short-Term Functional Goals: Time Frame: 2 weeks  1.  Patient will demonstrate independence with home exercise program.  2. Pt will report consistent use of the 'knack' when performing problem motions to work towards decreased incidence of incontinent episodes. Discharge Goals: Time Frame: 12 weeks  1. Pt will score 15% on PFIQ7 for overall functional improvement. 2. Pt will demonstrate improvement in strength to 3 and a hold of 10 sec for hypertrophy of PFmm and decreased incontinent episodes  3. Pt will report >= 50% improvement in incontinent episodes for ability to return to exercising  4. Pt will report decreased pad usage to 1ppd for decreased social anxiety  Rehabilitation Potential For Stated Goals: Good  Regarding [facility] therapy, I certify that the treatment plan above will be carried out by a therapist or under their direction. Thank you for this referral,  Frank Alcaraz PT     Referring Physician Signature: Sandra Yung MD              Date                    The information in this section was collected on 17  (except where otherwise noted). HISTORY:     History of Present Injury/Illness (Reason for Referral):  4 year history feels she got hurt at the gym. Circuit training class lifting wts over head and experienced sudden leakage of urine. Dr. [facility] dx with bladder drop. States symptoms come and go. She has stopped exercising and dancing d/t leaking. She does report increased incidence of constipation since injury and leaking is worse during these times. Past Medical History/Comorbidities:   Ms. Isrreal Concepcion  has a past medical history of Essential hypertension, benign (2015); HSV (herpes simplex virus) anogenital infection; Incontinence without sensory awareness; and Mixed hyperlipidemia (2015). She also has no past medical history of Difficult intubation; Malignant hyperthermia due to anesthesia; or Pseudocholinesterase deficiency. Ms. Isrrael Concepcion  has a past surgical history that includes  section; tonsillectomy; orthopaedic; and colonoscopy ().   Prior Level of Function/Work/Activity:  Would like to get back to the gym,   Pt is a    Previous Treatment Approaches:          none  Personal Factors:          Sex:  female        Age:  61 y.o. Current Medications:    Current Outpatient Prescriptions:     hydroCHLOROthiazide (MICROZIDE) 12.5 mg capsule, Take 1 Cap by mouth daily. , Disp: 90 Cap, Rfl: 3    scopolamine (TRANSDERM-SCOP) 1.5 mg (1 mg over 3 days) pt3d, 1 Patch by TransDERmal route every seventy-two (72) hours. , Disp: 4 Patch, Rfl: 0    Mth-Me Blue-Sod Phos-PhSal-Hyo (URIBEL) 118-10-40.8-36 mg cap capsule, Take 1 Cap by mouth four (4) times daily. , Disp: 30 Cap, Rfl: 1    ciprofloxacin HCl (CIPRO) 250 mg tablet, Take 1 Tab by mouth two (2) times a day., Disp: 10 Tab, Rfl: 1    BORIC ACID VAGINAL SUPPOSITORIES, Insert 400 mg into vagina daily. , Disp: 14 Suppository, Rfl: 6    potassium chloride SA (MICRO-K) 10 mEq capsule, Take 1 Cap by mouth daily. , Disp: 30 Cap, Rfl: 12    cetirizine (ZYRTEC) 10 mg tablet, Take 1 Tab by mouth nightly., Disp: 30 Tab, Rfl: 0   Date Last Reviewed:  06/21/17   Gynecological History:   · Number of pregnancies: 3, vaginal 2, C-sections 2  · Weight gain: no  · Episiotomy: unknown  Past Urinary Medical History:    · History of UTI, Menopause: yes every few months;  2 yrs ago  · Previous Treatments:   Incontinence History:  PROBLEM: YES/NO: COMMENTS:   Loss of urine with coughing YES    Loss of urine with lifting  YES    Loss of urine with exercise, running YES    Loss of urine with strong urge NO    Loss of urine with approaching the bathroom NO    Loss of urine with key in lock NO    Loss of urine as getting to toilet/removing clothing NO    Loss of urine when hearing running water NO    Have difficulty initiating a urine stream NO    Have difficulty stopping urine stream NO    Have to strain to empty bladder NO    Dribble urine when urinating NO    Dribble urine after emptying bladder NO    Experience pain with urination NO    Experience burning during urination NO    Have blood in urine NO      Voiding Patterns:  Patient voids every 2 hours during the day and 0 times during the night. Patient reports that she empties bladder fully. Patient uses pads for bladder protection; she changes pads 1-4 times per day. Usually can handle 1-2ppd if not active. Fluid Intake:  Patient drinks 7 cups of fluid per day. She consumes 2 cups of caffeinated beverages per day. Patient does not limit fluid intake to control bladder. Bowel Habits:  Patient demonstrates normal bowel habits. Mobility / Self Care: independent  Personal / Social History:  · Sexually active? YES:   · Social activities restricted due to urinary incontinence? NO:   n      Number of Personal Factors/Comorbidities that affect the Plan of Care: 1-2: MODERATE COMPLEXITY   EXAMINATION:   External Observation:   · Voluntary Contraction: present  · Voluntary Relaxation: present  · Involuntary Contraction: delayed  · Involuntary Relaxation: present  · Perineal Body Assessment: WNL  · Anal [place]: NT  · Skin Integrity: normal  · Q-tip Test: normal  · Vaginal Vault Size: increased    [place] PERFECT (Power/Endurnace/Repetitions/Fast Twitch/Elevation/Co-contraction/Timing) Scale:   · [place] PERFECT = 2+/3/6/9//  · Tissue support test with bearing down:  Grade 1: not visible at introitus; Grade 2: visible at introitus; Grade 3: tissue outside introitus  · Anterior Wall = 2   · Posterior Wall = 2   · Apical = 1   · Palpation:  No tenderness noted   Right Left   Bulbocavernosus     Ischocavernosus     Superficial Transverse Perineal     Sphincter Urethrae     Compressor Urethra      Urethra-vaginalis     Deep Transverse Perineium     Obturator Internus     Iliococcygeus     Coccygeus     Pubococcygeus     Levator Ani     Adductor     Psoas     6/7/17 - L PSIS higher,  LLE longer  Long sit test - LLE became longer      Body Structures Involved:  1. Nerves  2. Muscles  3.  Ligaments Body Functions Affected:  1. Mental  2. Sensory/Pain  3. Genitourinary  4. Neuromusculoskeletal  5. Movement Related  6. Skin Related Activities and Participation Affected:  1. Learning and Applying Knowledge  2. Mobility  3. Self Care  4. Interpersonal Interactions and Relationships  5. Community, Social and [place]   Number of elements that affect the Plan of Care: 1-2: LOW COMPLEXITY   CLINICAL PRESENTATION:   Presentation: Stable and uncomplicated: LOW COMPLEXITY   CLINICAL DECISION MAKING:   Outcome Measure: Tool Used: Pelvic Floor Impact Questionnaire--short form 7 (PFIQ-7)   Score:  Initial: 23.81 / 7/94%  · Bladder or Urine: 23.81  · Bowel or Rectum: 0  · Vagina or Pelvis: 0 Most Recent: X (Date: -- )  · Bladder or Urine: X  · Bowel or Rectum: X  · Vagina or Pelvis: X   Interpretation of Score: Each of the 7 sections is scored on a scale from 0-3; 0 representing \"Not at all\", 3 representing \"Quite a bit\". The mean value is taken from all the answered items, then multiplied by 100 to obtain the scale score, ranging from 0-100. This process is repeated for each column representing bowel, bladder, and pelvic pain. ? Self Care:     - CURRENT STATUS: CJ - 20%-39% impaired, limited or restricted    - GOAL STATUS: CI - 1%-19% impaired, limited or restricted    - D/C STATUS:  ---------------To be determined---------------     Medical Necessity:   · Patient demonstrates good rehab potential due to higher previous functional level. Reason for Services/Other Comments:  · Patient continues to require skilled intervention due to ongoing goals noted above. Use of outcome tool(s) and clinical judgement create a POC that gives a: Clear prediction of patient's progress: LOW COMPLEXITY   TREATMENT:   (In addition to Assessment/Re-Assessment sessions the following treatments were rendered)  Pre-treatment Symptoms/Complaints: Pt states she has been aching a little lately in low back.   Feels like when she has increased back pain or increased constipation she has more leaking  Pain: Initial:   Pain Intensity 1: 1 0 Post Session:  0     THERAPEUTIC EXERCISE: (35  minutes):  Exercises per grid below to improve strength and coordination. Required minimal verbal and tactile cues to promote proper body mechanics and promote proper body breathing techniques. Progressed resistance and repetitions as indicated. Date:  5/30/17 Date:  6/7/17 Date:  6/14/17 Date:  6/21/17   Activity/Exercise Parameters Parameters Parameters    Isometric PFmm w biofeedback See below See below     Bridge with ball squeeze   10x 10x   Bridge with isometric ER       Sidelying clams with TB   GTB 10x GTB 10x   sidestepping       Squats / sit to stand arms out   10x ea w extensive ed on form    Isometric TA w kegel   15' practice Bent knee fall outs 10x   Side plank on knees                      biofeedback utilized 10/10, 5/10, 2/4, modulation for improvement in strength, endurance, coordination, and decreased use of accessory muscles- held   NMES used x10'. 50Hz, 10/10 for hypertrophy of PFmm and improvement in strength-    Manual Therapy  (20') - correct left anterior rotated inominate. Right upslip  Exercises:  Patient instructed in pelvic floor exercises listed below:  5 sec hold 10x  TID  Also discussed performing knack during problem movements  The following educational topics were reviewed with patient:  Bladder health, tips to control urge, bladder diary, pelvic floor anatomy, how foods affect bladder, bladder retraining. Treatment/Session Assessment:  Tolerated tx well. Significant abdominal weakness and poor core stability - struggled with light ex's. Issued SI self MET for home to hopefully help maintain correction at home. · Response to Treatment:  good  · Compliance with Program/Exercises: Will assess as treatment progresses. · Recommendations/Intent for next treatment session:  \"Next visit will focus on advancements to more challenging activities\".   Total Treatment Duration:  PT Patient Time In/Time Out  Time In: 1400  Time Out: 1501 Jerel Barclay PT

## 2017-06-27 ENCOUNTER — HOSPITAL ENCOUNTER (OUTPATIENT)
Dept: PHYSICAL THERAPY | Age: 57
Discharge: HOME | End: 2017-06-27
Payer: COMMERCIAL

## 2017-06-27 PROCEDURE — 97110 THERAPEUTIC EXERCISES: CPT

## 2017-06-27 NOTE — PROGRESS NOTES
Ashli Roman  : 1960 [facility] at [address], [address] [place] [address],[address], [facility].  [phone]   [phone]          OUTPATIENT PHYSICAL THERAPY:Daily Note 2017    ICD-10: Treatment Diagnosis: Stress Incontinence (N39.3)  Precautions/Allergies:   Review of patient's allergies indicates no known allergies. Fall Risk Score: 0 (? 5 = High Risk)  MD Orders: Evaluate and treat MEDICAL/REFERRING DIAGNOSIS:  JONATHAN (stress urinary incontinence, female) [N39.3]   DATE OF ONSET: years but worsened within past 6 mos  REFERRING PHYSICIAN: Robert Vasquez MD  RETURN PHYSICIAN APPOINTMENT: unknown     INITIAL ASSESSMENT:  Ms. Mary Aden presents with decreased strength, endurance, and coordination of PFmm as noted with MMT. Uterine wall laxity likely contributing to active insufficiency of PFmm and ultimately decreased strength. She initially compensated with glut accessory muscles but much improved end of session. Pt will benefit from strengthening and functional training to address stated problems. Plan of care was discussed and agreed upon with patient and HEP was initiated. Thank you for the opportunity to work with this patient. PROBLEM LIST (Impacting functional limitations):  1. Decreased Strength  2. Decreased strength of pelvic floor which limits bladder control INTERVENTIONS PLANNED:  1. Neuromuscular re-education  2. Biofeedback as needed  3. HEP  4. Bladder retraining  5. Bladder education  6. Manual Therapy  7. Therapeutic Activites  8. Therapeutic Exercise/Strengthening   TREATMENT PLAN:  Effective Dates: 17 TO 17. Frequency/Duration: 1 time a week for 12 weeks  GOALS: (Goals have been discussed and agreed upon with patient.)  Short-Term Functional Goals: Time Frame: 2 weeks  1.  Patient will demonstrate independence with home exercise program.  2. Pt will report consistent use of the 'knack' when performing problem motions to work towards decreased incidence of incontinent episodes. Discharge Goals: Time Frame: 12 weeks  1. Pt will score 15% on PFIQ7 for overall functional improvement. 2. Pt will demonstrate improvement in strength to 3 and a hold of 10 sec for hypertrophy of PFmm and decreased incontinent episodes  3. Pt will report >= 50% improvement in incontinent episodes for ability to return to exercising  4. Pt will report decreased pad usage to 1ppd for decreased social anxiety  Rehabilitation Potential For Stated Goals: Good  Regarding [facility] Fitting therapy, I certify that the treatment plan above will be carried out by a therapist or under their direction. Thank you for this referral,  Siria Mccormack PT     Referring Physician Signature: Madelyne Hashimoto, MD              Date                    The information in this section was collected on 17  (except where otherwise noted). HISTORY:     History of Present Injury/Illness (Reason for Referral):  4 year history feels she got hurt at the gym. Circuit training class lifting wts over head and experienced sudden leakage of urine. Dr. Nile Louie dx with bladder drop. States symptoms come and go. She has stopped exercising and dancing d/t leaking. She does report increased incidence of constipation since injury and leaking is worse during these times. Past Medical History/Comorbidities:   Ms. Tricia Rice  has a past medical history of Essential hypertension, benign (2015); HSV (herpes simplex virus) anogenital infection; Incontinence without sensory awareness; and Mixed hyperlipidemia (2015). She also has no past medical history of Difficult intubation; Malignant hyperthermia due to anesthesia; or Pseudocholinesterase deficiency. Ms. Tricia Rice  has a past surgical history that includes  section; tonsillectomy; orthopaedic; and colonoscopy ().   Prior Level of Function/Work/Activity:  Would like to get back to the gym,   Pt is a    Previous Treatment Approaches:          none  Personal Factors:          Sex:  female        Age:  61 y.o. Current Medications:    Current Outpatient Prescriptions:     hydroCHLOROthiazide (MICROZIDE) 12.5 mg capsule, Take 1 Cap by mouth daily. , Disp: 90 Cap, Rfl: 3    scopolamine (TRANSDERM-SCOP) 1.5 mg (1 mg over 3 days) pt3d, 1 Patch by TransDERmal route every seventy-two (72) hours. , Disp: 4 Patch, Rfl: 0    Mth-Me Blue-Sod Phos-PhSal-Hyo (URIBEL) 118-10-40.8-36 mg cap capsule, Take 1 Cap by mouth four (4) times daily. , Disp: 30 Cap, Rfl: 1    ciprofloxacin HCl (CIPRO) 250 mg tablet, Take 1 Tab by mouth two (2) times a day., Disp: 10 Tab, Rfl: 1    BORIC ACID VAGINAL SUPPOSITORIES, Insert 400 mg into vagina daily. , Disp: 14 Suppository, Rfl: 6    potassium chloride SA (MICRO-K) 10 mEq capsule, Take 1 Cap by mouth daily. , Disp: 30 Cap, Rfl: 12    cetirizine (ZYRTEC) 10 mg tablet, Take 1 Tab by mouth nightly., Disp: 30 Tab, Rfl: 0   Date Last Reviewed:  06/27/17   Gynecological History:   · Number of pregnancies: 3, vaginal 2, C-sections 2  · Weight gain: no  · Episiotomy: unknown  Past Urinary Medical History:    · History of UTI, Menopause: yes every few months;  2 yrs ago  · Previous Treatments:   Incontinence History:  PROBLEM: YES/NO: COMMENTS:   Loss of urine with coughing YES    Loss of urine with lifting  YES    Loss of urine with exercise, running YES    Loss of urine with strong urge NO    Loss of urine with approaching the bathroom NO    Loss of urine with key in lock NO    Loss of urine as getting to toilet/removing clothing NO    Loss of urine when hearing running water NO    Have difficulty initiating a urine stream NO    Have difficulty stopping urine stream NO    Have to strain to empty bladder NO    Dribble urine when urinating NO    Dribble urine after emptying bladder NO    Experience pain with urination NO    Experience burning during urination NO    Have blood in urine NO      Voiding Patterns:  Patient voids every 2 hours during the day and 0 times during the night. Patient reports that she empties bladder fully. Patient uses pads for bladder protection; she changes pads 1-4 times per day. Usually can handle 1-2ppd if not active. Fluid Intake:  Patient drinks 7 cups of fluid per day. She consumes 2 cups of caffeinated beverages per day. Patient does not limit fluid intake to control bladder. Bowel Habits:  Patient demonstrates normal bowel habits. Mobility / Self Care: independent  Personal / Social History:  · Sexually active? YES:   · Social activities restricted due to urinary incontinence? NO:   n      Number of Personal Factors/Comorbidities that affect the Plan of Care: 1-2: MODERATE COMPLEXITY   EXAMINATION:   External Observation:   · Voluntary Contraction: present  · Voluntary Relaxation: present  · Involuntary Contraction: delayed  · Involuntary Relaxation: present  · Perineal Body Assessment: WNL  · Anal [place]: NT  · Skin Integrity: normal  · Q-tip Test: normal  · Vaginal Vault Size: increased    [place] PERFECT (Power/Endurnace/Repetitions/Fast Twitch/Elevation/Co-contraction/Timing) Scale:   · [place] PERFECT = 2+/3/6/9//  · Tissue support test with bearing down:  Grade 1: not visible at introitus; Grade 2: visible at introitus; Grade 3: tissue outside introitus  · Anterior Wall = 2   · Posterior Wall = 2   · Apical = 1   · Palpation:  No tenderness noted   Right Left   Bulbocavernosus     Ischocavernosus     Superficial Transverse Perineal     Sphincter Urethrae     Compressor Urethra      Urethra-vaginalis     Deep Transverse Perineium     Obturator Internus     Iliococcygeus     Coccygeus     Pubococcygeus     Levator Ani     Adductor     Psoas     6/7/17 - L PSIS higher,  LLE longer  Long sit test - LLE became longer      Body Structures Involved:  1. Nerves  2. Muscles  3.  Ligaments Body Functions Affected:  1. Mental  2. Sensory/Pain  3. Genitourinary  4. Neuromusculoskeletal  5. Movement Related  6. Skin Related Activities and Participation Affected:  1. Learning and Applying Knowledge  2. Mobility  3. Self Care  4. Interpersonal Interactions and Relationships  5. Community, Social and [place]   Number of elements that affect the Plan of Care: 1-2: LOW COMPLEXITY   CLINICAL PRESENTATION:   Presentation: Stable and uncomplicated: LOW COMPLEXITY   CLINICAL DECISION MAKING:   Outcome Measure: Tool Used: Pelvic Floor Impact Questionnaire--short form 7 (PFIQ-7)   Score:  Initial: 23.81 / 7/94%  · Bladder or Urine: 23.81  · Bowel or Rectum: 0  · Vagina or Pelvis: 0 Most Recent: X (Date: -- )  · Bladder or Urine: X  · Bowel or Rectum: X  · Vagina or Pelvis: X   Interpretation of Score: Each of the 7 sections is scored on a scale from 0-3; 0 representing \"Not at all\", 3 representing \"Quite a bit\". The mean value is taken from all the answered items, then multiplied by 100 to obtain the scale score, ranging from 0-100. This process is repeated for each column representing bowel, bladder, and pelvic pain. ? Self Care:     - CURRENT STATUS: CJ - 20%-39% impaired, limited or restricted    - GOAL STATUS: CI - 1%-19% impaired, limited or restricted    - D/C STATUS:  ---------------To be determined---------------     Medical Necessity:   · Patient demonstrates good rehab potential due to higher previous functional level. Reason for Services/Other Comments:  · Patient continues to require skilled intervention due to ongoing goals noted above. Use of outcome tool(s) and clinical judgement create a POC that gives a: Clear prediction of patient's progress: LOW COMPLEXITY   TREATMENT:   (In addition to Assessment/Re-Assessment sessions the following treatments were rendered)  Pre-treatment Symptoms/Complaints: Pt states back has not been as achy and has been doing self-correction ex every other day. Overall improvement noted but feels it is being stubborn the last little bit. Has been ending up putting pad on around the end of the day when starts leaking. Pain: Initial:   Pain Intensity 1: 0 0 Post Session:  0     THERAPEUTIC EXERCISE: (55  minutes):  Exercises per grid below to improve strength and coordination. Required minimal verbal and tactile cues to promote proper body mechanics and promote proper body breathing techniques. Progressed resistance and repetitions as indicated. Date:  6/14/17 Date:  6/21/17 Date:  6/27/17   Activity/Exercise Parameters     Isometric PFmm w biofeedback      Bridge with ball squeeze 10x 10x    Bridge with isometric ER      Sidelying clams with TB GTB 10x GTB 10x    sidestepping   1 lap GTB   Squats / sit to stand arms out 10x ea w extensive ed on form  10x   Isometric TA w kegel 15' practice Bent knee fall outs 10x    Side plank on knees   5\"5x B + 30sec standing side plank on counter   Single leg crunch   2x5 ea leg          biofeedback utilized 10/10, 5/10, 2/4, modulation for improvement in strength, endurance, coordination, and decreased use of accessory muscles- held   NMES used x10'. 50Hz, 10/10 for hypertrophy of PFmm and improvement in strength-    Manual Therapy  (0') - correct left anterior rotated inominate. Right upslip  Exercises:  Patient instructed in pelvic floor exercises listed below:  5 sec hold 10x  TID  Inc to 10/10 TID  6/27/17  Also discussed performing knack during problem movements  The following educational topics were reviewed with patient:  Bladder health, tips to control urge, bladder diary, pelvic floor anatomy, how foods affect bladder, bladder retraining. · Treatment/Session Assessment: Responding well to TE. Significant difficulty with side plank d/t core and shoulder girdle weakness. · Response to Treatment:  good  · Compliance with Program/Exercises: Will assess as treatment progresses.   · Recommendations/Intent for next treatment session: \"Next visit will focus on advancements to more challenging activities\".   Total Treatment Duration:  PT Patient Time In/Time Out  Time In: 1230  Time Out: Dennise Nuñez 25 Jody Benavides

## 2017-07-03 ENCOUNTER — HOSPITAL ENCOUNTER (OUTPATIENT)
Dept: PHYSICAL THERAPY | Age: 57
Discharge: HOME | End: 2017-07-03
Payer: COMMERCIAL

## 2017-07-03 PROCEDURE — 97110 THERAPEUTIC EXERCISES: CPT

## 2017-07-03 NOTE — PROGRESS NOTES
Eren Guevara  : 1960 [facility] at [address], [address] [place] [address],[address], [facility] [address]  [phone]   [phone]          OUTPATIENT PHYSICAL THERAPY:Daily Note 7/3/2017    ICD-10: Treatment Diagnosis: Stress Incontinence (N39.3)  Precautions/Allergies:   Review of patient's allergies indicates no known allergies. Fall Risk Score: 0 (? 5 = High Risk)  MD Orders: Evaluate and treat MEDICAL/REFERRING DIAGNOSIS:  JONATHAN (stress urinary incontinence, female) [N39.3]   DATE OF ONSET: years but worsened within past 6 mos  REFERRING PHYSICIAN: Alvy Simmonds, MD  RETURN PHYSICIAN APPOINTMENT: unknown     INITIAL ASSESSMENT:  Ms. Patricio Hairston presents with decreased strength, endurance, and coordination of PFmm as noted with MMT. Uterine wall laxity likely contributing to active insufficiency of PFmm and ultimately decreased strength. She initially compensated with glut accessory muscles but much improved end of session. Pt will benefit from strengthening and functional training to address stated problems. Plan of care was discussed and agreed upon with patient and HEP was initiated. Thank you for the opportunity to work with this patient. PROBLEM LIST (Impacting functional limitations):  1. Decreased Strength  2. Decreased strength of pelvic floor which limits bladder control INTERVENTIONS PLANNED:  1. Neuromuscular re-education  2. Biofeedback as needed  3. HEP  4. Bladder retraining  5. Bladder education  6. Manual Therapy  7. Therapeutic Activites  8. Therapeutic Exercise/Strengthening   TREATMENT PLAN:  Effective Dates: 17 TO 17. Frequency/Duration: 1 time a week for 12 weeks  GOALS: (Goals have been discussed and agreed upon with patient.)  Short-Term Functional Goals: Time Frame: 2 weeks  1.  Patient will demonstrate independence with home exercise program.  2. Pt will report consistent use of the 'knack' when performing problem motions to work towards decreased incidence of incontinent episodes. Discharge Goals: Time Frame: 12 weeks  1. Pt will score 15% on PFIQ7 for overall functional improvement. 2. Pt will demonstrate improvement in strength to 3 and a hold of 10 sec for hypertrophy of PFmm and decreased incontinent episodes  3. Pt will report >= 50% improvement in incontinent episodes for ability to return to exercising  4. Pt will report decreased pad usage to 1ppd for decreased social anxiety  Rehabilitation Potential For Stated Goals: Good  Regarding Ve[facility] Grey therapy, I certify that the treatment plan above will be carried out by a therapist or under their direction. Thank you for this referral,  Philippe Gilbert PT     Referring Physician Signature: Dominick Shultz MD              Date                    The information in this section was collected on 17  (except where otherwise noted). HISTORY:     History of Present Injury/Illness (Reason for Referral):  4 year history feels she got hurt at the gym. Circuit training class lifting wts over head and experienced sudden leakage of urine. Dr. Magen Armstrong dx with bladder drop. States symptoms come and go. She has stopped exercising and dancing d/t leaking. She does report increased incidence of constipation since injury and leaking is worse during these times. Past Medical History/Comorbidities:   Ms. Mónica Boland  has a past medical history of Essential hypertension, benign (2015); HSV (herpes simplex virus) anogenital infection; Incontinence without sensory awareness; and Mixed hyperlipidemia (2015). She also has no past medical history of Difficult intubation; Malignant hyperthermia due to anesthesia; or Pseudocholinesterase deficiency. Ms. Mónica Boland  has a past surgical history that includes  section; tonsillectomy; orthopaedic; and colonoscopy ().   Prior Level of Function/Work/Activity:  Would like to get back to the gym,   Pt is a    Previous Treatment Approaches:          none  Personal Factors:          Sex:  female        Age:  61 y.o. Current Medications:    Current Outpatient Prescriptions:     hydroCHLOROthiazide (MICROZIDE) 12.5 mg capsule, Take 1 Cap by mouth daily. , Disp: 90 Cap, Rfl: 3    scopolamine (TRANSDERM-SCOP) 1.5 mg (1 mg over 3 days) pt3d, 1 Patch by TransDERmal route every seventy-two (72) hours. , Disp: 4 Patch, Rfl: 0    Mth-Me Blue-Sod Phos-PhSal-Hyo (URIBEL) 118-10-40.8-36 mg cap capsule, Take 1 Cap by mouth four (4) times daily. , Disp: 30 Cap, Rfl: 1    ciprofloxacin HCl (CIPRO) 250 mg tablet, Take 1 Tab by mouth two (2) times a day., Disp: 10 Tab, Rfl: 1    BORIC ACID VAGINAL SUPPOSITORIES, Insert 400 mg into vagina daily. , Disp: 14 Suppository, Rfl: 6    potassium chloride SA (MICRO-K) 10 mEq capsule, Take 1 Cap by mouth daily. , Disp: 30 Cap, Rfl: 12    cetirizine (ZYRTEC) 10 mg tablet, Take 1 Tab by mouth nightly., Disp: 30 Tab, Rfl: 0   Date Last Reviewed:  07/03/17   Gynecological History:   · Number of pregnancies: 3, vaginal 2, C-sections 2  · Weight gain: no  · Episiotomy: unknown  Past Urinary Medical History:    · History of UTI, Menopause: yes every few months;  2 yrs ago  · Previous Treatments:   Incontinence History:  PROBLEM: YES/NO: COMMENTS:   Loss of urine with coughing YES    Loss of urine with lifting  YES    Loss of urine with exercise, running YES    Loss of urine with strong urge NO    Loss of urine with approaching the bathroom NO    Loss of urine with key in lock NO    Loss of urine as getting to toilet/removing clothing NO    Loss of urine when hearing running water NO    Have difficulty initiating a urine stream NO    Have difficulty stopping urine stream NO    Have to strain to empty bladder NO    Dribble urine when urinating NO    Dribble urine after emptying bladder NO    Experience pain with urination NO    Experience burning during urination NO    Have blood in urine NO      Voiding Patterns:  Patient voids every 2 hours during the day and 0 times during the night. Patient reports that she empties bladder fully. Patient uses pads for bladder protection; she changes pads 1-4 times per day. Usually can handle 1-2ppd if not active. Fluid Intake:  Patient drinks 7 cups of fluid per day. She consumes 2 cups of caffeinated beverages per day. Patient does not limit fluid intake to control bladder. Bowel Habits:  Patient demonstrates normal bowel habits. Mobility / Self Care: independent  Personal / Social History:  · Sexually active? YES:   · Social activities restricted due to urinary incontinence? NO:   n      Number of Personal Factors/Comorbidities that affect the Plan of Care: 1-2: MODERATE COMPLEXITY   EXAMINATION:   External Observation:   · Voluntary Contraction: present  · Voluntary Relaxation: present  · Involuntary Contraction: delayed  · Involuntary Relaxation: present  · Perineal Body Assessment: WNL  · Anal [place]: NT  · Skin Integrity: normal  · Q-tip Test: normal  · Vaginal Vault Size: increased    [place] PERFECT (Power/Endurnace/Repetitions/Fast Twitch/Elevation/Co-contraction/Timing) Scale:   · [place] PERFECT = 2+/3/6/9//  · Tissue support test with bearing down:  Grade 1: not visible at introitus; Grade 2: visible at introitus; Grade 3: tissue outside introitus  · Anterior Wall = 2   · Posterior Wall = 2   · Apical = 1   · Palpation:  No tenderness noted   Right Left   Bulbocavernosus     Ischocavernosus     Superficial Transverse Perineal     Sphincter Urethrae     Compressor Urethra      Urethra-vaginalis     Deep Transverse Perineium     Obturator Internus     Iliococcygeus     Coccygeus     Pubococcygeus     Levator Ani     Adductor     Psoas     6/7/17 - L PSIS higher,  LLE longer  Long sit test - LLE became longer      Body Structures Involved:  1. Nerves  2. Muscles  3.  Ligaments Body Functions Affected:  1. Mental  2. Sensory/Pain  3. Genitourinary  4. Neuromusculoskeletal  5. Movement Related  6. Skin Related Activities and Participation Affected:  1. Learning and Applying Knowledge  2. Mobility  3. Self Care  4. Interpersonal Interactions and Relationships  5. Community, Social and [place]   Number of elements that affect the Plan of Care: 1-2: LOW COMPLEXITY   CLINICAL PRESENTATION:   Presentation: Stable and uncomplicated: LOW COMPLEXITY   CLINICAL DECISION MAKING:   Outcome Measure: Tool Used: Pelvic Floor Impact Questionnaire--short form 7 (PFIQ-7)   Score:  Initial: 23.81 / 7/94%  · Bladder or Urine: 23.81  · Bowel or Rectum: 0  · Vagina or Pelvis: 0 Most Recent: X (Date: -- )  · Bladder or Urine: X  · Bowel or Rectum: X  · Vagina or Pelvis: X   Interpretation of Score: Each of the 7 sections is scored on a scale from 0-3; 0 representing \"Not at all\", 3 representing \"Quite a bit\". The mean value is taken from all the answered items, then multiplied by 100 to obtain the scale score, ranging from 0-100. This process is repeated for each column representing bowel, bladder, and pelvic pain. ? Self Care:     - CURRENT STATUS: CJ - 20%-39% impaired, limited or restricted    - GOAL STATUS: CI - 1%-19% impaired, limited or restricted    - D/C STATUS:  ---------------To be determined---------------     Medical Necessity:   · Patient demonstrates good rehab potential due to higher previous functional level. Reason for Services/Other Comments:  · Patient continues to require skilled intervention due to ongoing goals noted above. Use of outcome tool(s) and clinical judgement create a POC that gives a: Clear prediction of patient's progress: LOW COMPLEXITY   TREATMENT:   (In addition to Assessment/Re-Assessment sessions the following treatments were rendered)  Pre-treatment Symptoms/Complaints: Pt reports 1 liner/day. Still does not trust herself when going out shopping.   Does not wear liner when goes to work  Pain: Initial:   Pain Intensity 1: 0 0 Post Session:  0     THERAPEUTIC EXERCISE: (55  minutes):  Exercises per grid below to improve strength and coordination. Required minimal verbal and tactile cues to promote proper body mechanics and promote proper body breathing techniques. Progressed resistance and repetitions as indicated. Date:  6/14/17 Date:  6/21/17 Date:  6/27/17 Date:  7/3/17   Activity/Exercise Parameters      Isometric PFmm w biofeedback       Bridge with ball squeeze 10x 10x  10x   Bridge with isometric ER    10x   Sidelying clams with TB GTB 10x GTB 10x  No band feet elevated 10x   sidestepping   1 lap GTB Sidestep squats 1 lap GTB   Squats / sit to stand arms out 10x ea w extensive ed on form  10x One foot on stool 8x ea LE   Isometric TA w kegel 15' practice Bent knee fall outs 10x     Side plank on knees   5\"5x B + 30sec standing side plank on counter    Single leg crunch   2x5 ea leg    DKC, piriformis stretch    [id number] ea   Trampoline jump    90sec   [facility] balance work    5x     D/c           biofeedback utilized 10/10, 5/10, 2/4, modulation for improvement in strength, endurance, coordination, and decreased use of accessory muscles- held   NMES used x10'. 50Hz, 10/10 for hypertrophy of PFmm and improvement in strength-    Manual Therapy  (0') - correct left anterior rotated inominate. Right upslip  Exercises:  Patient instructed in pelvic floor exercises listed below:  5 sec hold 10x  TID  Inc to 10/10 TID  6/27/17  Also discussed performing knack during problem movements  The following educational topics were reviewed with patient:  Bladder health, tips to control urge, bladder diary, pelvic floor anatomy, how foods affect bladder, bladder retraining. · Treatment/Session Assessment: good tolerance to ex's. Mild leaking with trampoline jumps. Try this first thing next visit for less fatigue.   · Response to Treatment:  good  · Compliance with Program/Exercises: Will assess as treatment progresses. · Recommendations/Intent for next treatment session: \"Next visit will focus on advancements to more challenging activities\".   Total Treatment Duration:  PT Patient Time In/Time Out  Time In: 0800  Time Out: 0900    Aldair Sherman PT

## 2017-07-12 ENCOUNTER — HOSPITAL ENCOUNTER (OUTPATIENT)
Dept: PHYSICAL THERAPY | Age: 57
Discharge: HOME | End: 2017-07-12
Payer: COMMERCIAL

## 2017-07-12 PROCEDURE — 97110 THERAPEUTIC EXERCISES: CPT

## 2017-07-12 PROCEDURE — 97140 MANUAL THERAPY 1/> REGIONS: CPT

## 2017-07-12 NOTE — PROGRESS NOTES
Minerva Fraga  : 1960 [facility] at [address], [address] [place] [address],[address], [facility].  [phone]   [phone]          OUTPATIENT PHYSICAL THERAPY:Daily Note 2017    ICD-10: Treatment Diagnosis: Stress Incontinence (N39.3)  Precautions/Allergies:   Review of patient's allergies indicates no known allergies. Fall Risk Score: 0 (? 5 = High Risk)  MD Orders: Evaluate and treat MEDICAL/REFERRING DIAGNOSIS:  JONATHAN (stress urinary incontinence, female) [N39.3]   DATE OF ONSET: years but worsened within past 6 mos  REFERRING PHYSICIAN: Jer Bolden MD  RETURN PHYSICIAN APPOINTMENT: unknown     INITIAL ASSESSMENT:  Ms. Ovidio Mejia presents with decreased strength, endurance, and coordination of PFmm as noted with MMT. Uterine wall laxity likely contributing to active insufficiency of PFmm and ultimately decreased strength. She initially compensated with glut accessory muscles but much improved end of session. Pt will benefit from strengthening and functional training to address stated problems. Plan of care was discussed and agreed upon with patient and HEP was initiated. Thank you for the opportunity to work with this patient. PROBLEM LIST (Impacting functional limitations):  1. Decreased Strength  2. Decreased strength of pelvic floor which limits bladder control INTERVENTIONS PLANNED:  1. Neuromuscular re-education  2. Biofeedback as needed  3. HEP  4. Bladder retraining  5. Bladder education  6. Manual Therapy  7. Therapeutic Activites  8. Therapeutic Exercise/Strengthening   TREATMENT PLAN:  Effective Dates: 17 TO 17. Frequency/Duration: 1 time a week for 12 weeks  GOALS: (Goals have been discussed and agreed upon with patient.)  Short-Term Functional Goals: Time Frame: 2 weeks  1.  Patient will demonstrate independence with home exercise program.  2. Pt will report consistent use of the 'knack' when performing problem motions to work towards decreased incidence of incontinent episodes. Discharge Goals: Time Frame: 12 weeks  1. Pt will score 15% on PFIQ7 for overall functional improvement. 2. Pt will demonstrate improvement in strength to 3 and a hold of 10 sec for hypertrophy of PFmm and decreased incontinent episodes  3. Pt will report >= 50% improvement in incontinent episodes for ability to return to exercising  4. Pt will report decreased pad usage to 1ppd for decreased social anxiety  Rehabilitation Potential For Stated Goals: Good  Regarding [facility] therapy, I certify that the treatment plan above will be carried out by a therapist or under their direction. Thank you for this referral,  Sai Rivero PT     Referring Physician Signature: Kiana Bañuelos MD              Date                    The information in this section was collected on 17  (except where otherwise noted). HISTORY:     History of Present Injury/Illness (Reason for Referral):  4 year history feels she got hurt at the gym. Circuit training class lifting wts over head and experienced sudden leakage of urine. Dr. Annie Ko dx with bladder drop. States symptoms come and go. She has stopped exercising and dancing d/t leaking. She does report increased incidence of constipation since injury and leaking is worse during these times. Past Medical History/Comorbidities:   Ms. Aguilar Baird  has a past medical history of Essential hypertension, benign (2015); HSV (herpes simplex virus) anogenital infection; Incontinence without sensory awareness; and Mixed hyperlipidemia (2015). She also has no past medical history of Difficult intubation; Malignant hyperthermia due to anesthesia; or Pseudocholinesterase deficiency. Ms. Aguilar Baird  has a past surgical history that includes  section; tonsillectomy; orthopaedic; and colonoscopy ().   Prior Level of Function/Work/Activity:  Would like to get back to the gym,   Pt is a    Previous Treatment Approaches:          none  Personal Factors:          Sex:  female        Age:  61 y.o. Current Medications:    Current Outpatient Prescriptions:     hydroCHLOROthiazide (MICROZIDE) 12.5 mg capsule, Take 1 Cap by mouth daily. , Disp: 90 Cap, Rfl: 3    scopolamine (TRANSDERM-SCOP) 1.5 mg (1 mg over 3 days) pt3d, 1 Patch by TransDERmal route every seventy-two (72) hours. , Disp: 4 Patch, Rfl: 0    Mth-Me Blue-Sod Phos-PhSal-Hyo (URIBEL) 118-10-40.8-36 mg cap capsule, Take 1 Cap by mouth four (4) times daily. , Disp: 30 Cap, Rfl: 1    ciprofloxacin HCl (CIPRO) 250 mg tablet, Take 1 Tab by mouth two (2) times a day., Disp: 10 Tab, Rfl: 1    BORIC ACID VAGINAL SUPPOSITORIES, Insert 400 mg into vagina daily. , Disp: 14 Suppository, Rfl: 6    potassium chloride SA (MICRO-K) 10 mEq capsule, Take 1 Cap by mouth daily. , Disp: 30 Cap, Rfl: 12    cetirizine (ZYRTEC) 10 mg tablet, Take 1 Tab by mouth nightly., Disp: 30 Tab, Rfl: 0   Date Last Reviewed:  07/12/17   Gynecological History:   · Number of pregnancies: 3, vaginal 2, C-sections 2  · Weight gain: no  · Episiotomy: unknown  Past Urinary Medical History:    · History of UTI, Menopause: yes every few months;  2 yrs ago  · Previous Treatments:   Incontinence History:  PROBLEM: YES/NO: COMMENTS:   Loss of urine with coughing YES    Loss of urine with lifting  YES    Loss of urine with exercise, running YES    Loss of urine with strong urge NO    Loss of urine with approaching the bathroom NO    Loss of urine with key in lock NO    Loss of urine as getting to toilet/removing clothing NO    Loss of urine when hearing running water NO    Have difficulty initiating a urine stream NO    Have difficulty stopping urine stream NO    Have to strain to empty bladder NO    Dribble urine when urinating NO    Dribble urine after emptying bladder NO    Experience pain with urination NO    Experience burning during urination NO    Have blood in urine NO      Voiding Patterns:  Patient voids every 2 hours during the day and 0 times during the night. Patient reports that she empties bladder fully. Patient uses pads for bladder protection; she changes pads 1-4 times per day. Usually can handle 1-2ppd if not active. Fluid Intake:  Patient drinks 7 cups of fluid per day. She consumes 2 cups of caffeinated beverages per day. Patient does not limit fluid intake to control bladder. Bowel Habits:  Patient demonstrates normal bowel habits. Mobility / Self Care: independent  Personal / Social History:  · Sexually active? YES:   · Social activities restricted due to urinary incontinence? NO:   n      Number of Personal Factors/Comorbidities that affect the Plan of Care: 1-2: MODERATE COMPLEXITY   EXAMINATION:   External Observation:   · Voluntary Contraction: present  · Voluntary Relaxation: present  · Involuntary Contraction: delayed  · Involuntary Relaxation: present  · Perineal Body Assessment: WNL  · Anal [place]: NT  · Skin Integrity: normal  · Q-tip Test: normal  · Vaginal Vault Size: increased    [place] PERFECT (Power/Endurnace/Repetitions/Fast Twitch/Elevation/Co-contraction/Timing) Scale:   · [place] PERFECT = 2+/3/6/9//  · Tissue support test with bearing down:  Grade 1: not visible at introitus; Grade 2: visible at introitus; Grade 3: tissue outside introitus  · Anterior Wall = 2   · Posterior Wall = 2   · Apical = 1   · Palpation:  No tenderness noted   Right Left   Bulbocavernosus     Ischocavernosus     Superficial Transverse Perineal     Sphincter Urethrae     Compressor Urethra      Urethra-vaginalis     Deep Transverse Perineium     Obturator Internus     Iliococcygeus     Coccygeus     Pubococcygeus     Levator Ani     Adductor     Psoas     6/7/17 - L PSIS higher,  LLE longer  Long sit test - LLE became longer      Body Structures Involved:  1. Nerves  2. Muscles  3.  Ligaments Body Functions Affected:  1. Mental  2. Sensory/Pain  3. Genitourinary  4. Neuromusculoskeletal  5. Movement Related  6. Skin Related Activities and Participation Affected:  1. Learning and Applying Knowledge  2. Mobility  3. Self Care  4. Interpersonal Interactions and Relationships  5. Community, Social and [place]   Number of elements that affect the Plan of Care: 1-2: LOW COMPLEXITY   CLINICAL PRESENTATION:   Presentation: Stable and uncomplicated: LOW COMPLEXITY   CLINICAL DECISION MAKING:   Outcome Measure: Tool Used: Pelvic Floor Impact Questionnaire--short form 7 (PFIQ-7)   Score:  Initial: 23.81 / 7/94%  · Bladder or Urine: 23.81  · Bowel or Rectum: 0  · Vagina or Pelvis: 0 Most Recent: X (Date: -- )  · Bladder or Urine: X  · Bowel or Rectum: X  · Vagina or Pelvis: X   Interpretation of Score: Each of the 7 sections is scored on a scale from 0-3; 0 representing \"Not at all\", 3 representing \"Quite a bit\". The mean value is taken from all the answered items, then multiplied by 100 to obtain the scale score, ranging from 0-100. This process is repeated for each column representing bowel, bladder, and pelvic pain. ? Self Care:     - CURRENT STATUS: CJ - 20%-39% impaired, limited or restricted    - GOAL STATUS: CI - 1%-19% impaired, limited or restricted    - D/C STATUS:  ---------------To be determined---------------     Medical Necessity:   · Patient demonstrates good rehab potential due to higher previous functional level. Reason for Services/Other Comments:  · Patient continues to require skilled intervention due to ongoing goals noted above. Use of outcome tool(s) and clinical judgement create a POC that gives a: Clear prediction of patient's progress: LOW COMPLEXITY   TREATMENT:   (In addition to Assessment/Re-Assessment sessions the following treatments were rendered)  Pre-treatment Symptoms/Complaints: Pt states she has been doing well overall but had set back on Sunday.    Constipated with back discomfort and LE's bothering her that day. Went to the store and experienced a bad leak. Constipation and bloating the rest of the day. Stress incontinence improved once constipation and bloating resolved. She states she has this problem about 2x/week. Pain: Initial:   Pain Intensity 1: 0 0 Post Session:  0     THERAPEUTIC EXERCISE: (30  minutes):  Exercises per grid below to improve strength and coordination. Required minimal verbal and tactile cues to promote proper body mechanics and promote proper body breathing techniques. Progressed resistance and repetitions as indicated. Date:  6/14/17 Date:  6/21/17 Date:  6/27/17 Date:  7/3/17 Date:  7/12/17   Activity/Exercise Parameters       Isometric PFmm w biofeedback     NMES 10'   Bridge with ball squeeze 10x 10x  10x    Bridge with isometric ER    10x    Sidelying clams with TB GTB 10x GTB 10x  No band feet elevated 10x    sidestepping   1 lap GTB Sidestep squats 1 lap GTB    Squats / sit to stand arms out 10x ea w extensive ed on form  10x One foot on stool 8x ea LE    Isometric TA w kegel 15' practice Bent knee fall outs 10x      Side plank on knees   5\"5x B + 30sec standing side plank on counter     Single leg crunch   2x5 ea leg     DKC, piriformis stretch    [id number] ea    Trampoline jump    90sec 90sec   [facility] balance work    5x     D/c    HEP review     Done. Progress ex's next visit    biofeedback utilized 10/10, 5/10, 2/4, modulation for improvement in strength, endurance, coordination, and decreased use of accessory muscles- held   NMES used x10'.   50Hz, 10/10 for hypertrophy of PFmm and improvement in strength-    Manual Therapy  (25'') - correct left anterior rotated inominate. Right upslip. LTR stretch  Exercises:  Patient instructed in pelvic floor exercises listed below:  5 sec hold 10x  TID  Inc to 10/10 TID  6/27/17  Also discussed performing knack during problem movements  The following educational topics were reviewed with patient:  Bladder health, tips to control urge, bladder diary, pelvic floor anatomy, how foods affect bladder, bladder retraining. · Treatment/Session Assessment: tramp jumps early in session with mild leaking. Tolerated tx well with pt reporting improvement in back tightness after correction. Constipation/bloating likely a  large factor in Sunday's incident. SIJ irritation possible contributor also. · Response to Treatment:  good  · Compliance with Program/Exercises: Will assess as treatment progresses. · Recommendations/Intent for next treatment session: \"Next visit will focus on advancements to more challenging activities\".   Total Treatment Duration:  PT Patient Time In/Time Out  Time In: 1430  Time Out: 1530    Rocio Law, PT

## 2017-07-28 ENCOUNTER — HOSPITAL ENCOUNTER (OUTPATIENT)
Dept: PHYSICAL THERAPY | Age: 57
Discharge: HOME | End: 2017-07-28
Payer: COMMERCIAL

## 2017-07-28 PROCEDURE — 97530 THERAPEUTIC ACTIVITIES: CPT

## 2017-07-28 NOTE — PROGRESS NOTES
Reji Goddard  : 1960 [facility]  [address], [address] [address],[address], [address] [facility]  [phone]   [phone]          OUTPATIENT PHYSICAL THERAPY:Discharge 2017    ICD-10: Treatment Diagnosis: Stress Incontinence (N39.3)  Precautions/Allergies:   Review of patient's allergies indicates no known allergies. Fall Risk Score: 0 (? 5 = High Risk)  MD Orders: Evaluate and treat MEDICAL/REFERRING DIAGNOSIS:  JONATHAN (stress urinary incontinence, female) [N39.3]   DATE OF ONSET: years but worsened within past 6 mos  REFERRING PHYSICIAN: Rolanda Lopez MD  RETURN PHYSICIAN APPOINTMENT: unknown   RE-EVALUATION:     17 - Pt demonstrates overall improvement in muscle strength and ability to maintain continence with daily work activities. She continues with incontinent episodes on days she is on her feet more with increased walking and fatigue of PFmm. She reports 25% improvement in her stress incontinence and has benefited from PT but not to the extent to give her the quality of life she would like. Pt is planning on returning to [place] to schedule surgery. Thank you for your referral.  Pt is now discharged from PT. INITIAL ASSESSMENT:  Ms. Uzair Rodarte presents with decreased strength, endurance, and coordination of PFmm as noted with MMT. Uterine wall laxity likely contributing to active insufficiency of PFmm and ultimately decreased strength. She initially compensated with glut accessory muscles but much improved end of session. Pt will benefit from strengthening and functional training to address stated problems. Plan of care was discussed and agreed upon with patient and HEP was initiated. Thank you for the opportunity to work with this patient. PROBLEM LIST (Impacting functional limitations):  1. Decreased Strength  2.  Decreased strength of pelvic floor which limits bladder control INTERVENTIONS PLANNED:  1. Neuromuscular re-education  2. Biofeedback as needed  3. HEP  4. Bladder retraining  5. Bladder education  6. Manual Therapy  7. Therapeutic Activites  8. Therapeutic Exercise/Strengthening   TREATMENT PLAN:    Effective Dates: 6/24/17 TO 8/18/17. Frequency/Duration: 1 time a week for 12 weeks  GOALS: (Goals have been discussed and agreed upon with patient.)  Short-Term Functional Goals: Time Frame: 2 weeks  1. Patient will demonstrate independence with home exercise program.  GOAL MET  2. Pt will report consistent use of the 'knack' when performing problem motions to work towards decreased incidence of incontinent episodes. GOAL MET  Discharge Goals: Time Frame: 12 weeks  1. Pt will score 15% on PFIQ7 for overall functional improvement. GOAL MET 7/28/17  2. Pt will demonstrate improvement in strength to 3 and a hold of 10 sec for hypertrophy of PFmm and decreased incontinent episodes  3. Pt will report >= 50% improvement in incontinent episodes for ability to return to exercising  GOAL IMPROVED BUT NOT MET  4. Pt will report decreased pad usage to 1ppd for decreased social anxiety. PARTIALLY MET  Rehabilitation Potential For Stated Goals: Good  Regarding Verena Grey therapy, I certify that the treatment plan above will be carried out by a therapist or under their direction. Thank you for this referral,  Philippe Gilbert, PT     Referring Physician Signature: Dominick Shultz MD              Date                    The information in this section was collected on 07/28/17  (except where otherwise noted). HISTORY:     History of Present Injury/Illness (Reason for Referral):  4 year history feels she got hurt at the gym. Circuit training class lifting wts over head and experienced sudden leakage of urine. Dr. Magen Armstrong dx with bladder drop. States symptoms come and go. She has stopped exercising and dancing d/t leaking.   She does report increased incidence of constipation since injury and leaking is worse during these times. Past Medical History/Comorbidities:   Ms. Eddie Rosenberg  has a past medical history of Essential hypertension, benign (2015); HSV (herpes simplex virus) anogenital infection; Incontinence without sensory awareness; and Mixed hyperlipidemia (2015). She also has no past medical history of Difficult intubation; Malignant hyperthermia due to anesthesia; or Pseudocholinesterase deficiency. Ms. Eddie Rosenberg  has a past surgical history that includes  section; tonsillectomy; orthopaedic; and colonoscopy (). Prior Level of Function/Work/Activity:  Would like to get back to the gym,   Pt is a    Previous Treatment Approaches:          none  Personal Factors:          Sex:  female        Age:  61 y.o. Current Medications:    Current Outpatient Prescriptions:     hydroCHLOROthiazide (MICROZIDE) 12.5 mg capsule, Take 1 Cap by mouth daily. , Disp: 90 Cap, Rfl: 3    scopolamine (TRANSDERM-SCOP) 1.5 mg (1 mg over 3 days) pt3d, 1 Patch by TransDERmal route every seventy-two (72) hours. , Disp: 4 Patch, Rfl: 0    [facility] Blue-Sod Phos-PhSal-Hyo (URIBEL) 118-10-40.8-36 mg cap capsule, Take 1 Cap by mouth four (4) times daily. , Disp: 30 Cap, Rfl: 1    ciprofloxacin HCl (CIPRO) 250 mg tablet, Take 1 Tab by mouth two (2) times a day., Disp: 10 Tab, Rfl: 1    BORIC ACID VAGINAL SUPPOSITORIES, Insert 400 mg into vagina daily. , Disp: 14 Suppository, Rfl: 6    potassium chloride SA (MICRO-K) 10 mEq capsule, Take 1 Cap by mouth daily. , Disp: 30 Cap, Rfl: 12    cetirizine (ZYRTEC) 10 mg tablet, Take 1 Tab by mouth nightly., Disp: 30 Tab, Rfl: 0   Date Last Reviewed:  17   Gynecological History:   · Number of pregnancies: 3, vaginal 2, C-sections 2  · Weight gain: no  · Episiotomy: unknown  Past Urinary Medical History:    · History of UTI, Menopause: yes every few months;  2 yrs ago  · Previous Treatments:   Incontinence History:  PROBLEM: YES/NO: COMMENTS:   Loss of urine with coughing YES Loss of urine with lifting  YES    Loss of urine with exercise, running YES    Loss of urine with strong urge NO    Loss of urine with approaching the bathroom NO    Loss of urine with key in lock NO    Loss of urine as getting to toilet/removing clothing NO    Loss of urine when hearing running water NO    Have difficulty initiating a urine stream NO    Have difficulty stopping urine stream NO    Have to strain to empty bladder NO    Dribble urine when urinating NO    Dribble urine after emptying bladder NO    Experience pain with urination NO    Experience burning during urination NO    Have blood in urine NO      Voiding Patterns:  Patient voids every 2 hours during the day and 0 times during the night. Patient reports that she empties bladder fully. Patient uses pads for bladder protection; she changes pads 1-4 times per day. Usually can handle 1-2ppd if not active. Fluid Intake:  Patient drinks 7 cups of fluid per day. She consumes 2 cups of caffeinated beverages per day. Patient does not limit fluid intake to control bladder. Bowel Habits:  Patient demonstrates normal bowel habits. Mobility / Self Care: independent  Personal / Social History:  · Sexually active? YES:   · Social activities restricted due to urinary incontinence?  NO:   n      Number of Personal Factors/Comorbidities that affect the Plan of Care: 1-2: MODERATE COMPLEXITY   EXAMINATION:   External Observation:   · Voluntary Contraction: present  · Voluntary Relaxation: present  · Involuntary Contraction: delayed  · Involuntary Relaxation: present  · Perineal Body Assessment: WNL  · Anal [place]: NT  · Skin Integrity: normal  · Q-tip Test: normal  · Vaginal Vault Size: increased    [place] PERFECT (Power/Endurnace/Repetitions/Fast Twitch/Elevation/Co-contraction/Timing) Scale:   · [place] PERFECT = 2+/3/6/9//  · Tissue support test with bearing down:  Grade 1: not visible at introitus; Grade 2: visible at introitus; Grade 3: tissue outside introitus  · Anterior Wall = 2   · Posterior Wall = 2   · Apical = 1   · Palpation:  No tenderness noted   Right Left   Bulbocavernosus     Ischocavernosus     Superficial Transverse Perineal     Sphincter Urethrae     Compressor Urethra      Urethra-vaginalis     Deep Transverse Perineium     Obturator Internus     Iliococcygeus     Coccygeus     Pubococcygeus     Levator Ani     Adductor     Psoas     6/7/17 - L PSIS higher,  LLE longer  Long sit test - LLE became longer      Body Structures Involved:  1. Nerves  2. Muscles  3. Ligaments Body Functions Affected:  1. Mental  2. Sensory/Pain  3. Genitourinary  4. Neuromusculoskeletal  5. Movement Related  6. Skin Related Activities and Participation Affected:  1. Learning and Applying Knowledge  2. Mobility  3. Self Care  4. Interpersonal Interactions and Relationships  5. Community, Social and [place]   Number of elements that affect the Plan of Care: 1-2: LOW COMPLEXITY   CLINICAL PRESENTATION:   Presentation: Stable and uncomplicated: LOW COMPLEXITY   CLINICAL DECISION MAKING:   Outcome Measure: Tool Used: Pelvic Floor Impact Questionnaire--short form 7 (PFIQ-7)   Score:  Initial: 23.81 / 7/94%  · Bladder or Urine: 23.81  · Bowel or Rectum: 0  · Vagina or Pelvis: 0 Most Recent: 47.52  15.87% (Date: 7/28/17 )  · Bladder or Urine: 10  · Bowel or Rectum: X  · Vagina or Pelvis: X   Interpretation of Score: Each of the 7 sections is scored on a scale from 0-3; 0 representing \"Not at all\", 3 representing \"Quite a bit\". The mean value is taken from all the answered items, then multiplied by 100 to obtain the scale score, ranging from 0-100. This process is repeated for each column representing bowel, bladder, and pelvic pain. ?  Self Care:     - CURRENT STATUS: CJ - 20%-39% impaired, limited or restricted    - GOAL STATUS: CI - 1%-19% impaired, limited or restricted    - D/C STATUS:  CI - 1%-19% impaired, limited or restricted     Medical Necessity:   · Patient demonstrates good rehab potential due to higher previous functional level. Reason for Services/Other Comments:  · Patient continues to require skilled intervention due to ongoing goals noted above. Use of outcome tool(s) and clinical judgement create a POC that gives a: Clear prediction of patient's progress: LOW COMPLEXITY   TREATMENT:   (In addition to Assessment/Re-Assessment sessions the following treatments were rendered)  Pre-treatment Symptoms/Complaints: Pt states she was at job fair and did well all day until was walking up a hill and had bad accident. States back has been doing well. Pain: Initial:   Pain Intensity 1: 0 0 Post Session:  0     THERAPEUTIC EXERCISE: (0  minutes):  Exercises per grid below to improve strength and coordination. Required minimal verbal and tactile cues to promote proper body mechanics and promote proper body breathing techniques. Progressed resistance and repetitions as indicated.     Date:  6/14/17 Date:  6/21/17 Date:  6/27/17 Date:  7/3/17 Date:  7/12/17   Activity/Exercise Parameters       Isometric PFmm w biofeedback     NMES 10'   Bridge with ball squeeze 10x 10x  10x    Bridge with isometric ER    10x    Sidelying clams with TB GTB 10x GTB 10x  No band feet elevated 10x    sidestepping   1 lap GTB Sidestep squats 1 lap GTB    Squats / sit to stand arms out 10x ea w extensive ed on form  10x One foot on stool 8x ea LE    Isometric TA w kegel 15' practice Bent knee fall outs 10x      Side plank on knees   5\"5x B + 30sec standing side plank on counter     Single leg crunch   2x5 ea leg     DKC, piriformis stretch    [id number] ea    Trampoline jump    90sec 90sec   [facility] balance work    5x     D/c    HEP review Done.  Progress ex's next visit    biofeedback utilized 10/10, 5/10, 2/4, modulation for improvement in strength, endurance, coordination, and decreased use of accessory muscles- held   NMES used x10'. 50Hz, 10/10 for hypertrophy of PFmm and improvement in strength-    Manual Therapy  (0'') - correct left anterior rotated inominate. Right upslip. LTR stretch  Exercises:  Patient instructed in pelvic floor exercises listed below:  5 sec hold 10x  TID  Inc to 10/10 TID  6/27/17  Also discussed performing knack during problem movements   Therapeutic Activity (15') - Discussed pt's progress with PT, HEP, potential bladder surgery, and expectations of PT and surgery. The following educational topics were reviewed with patient:  Bladder health, tips to control urge, bladder diary, pelvic floor anatomy, how foods affect bladder, bladder retraining. · Treatment/Session Assessment: see above  · Response to Treatment:  good  · Compliance with Program/Exercises: Will assess as treatment progresses. · Recommendations/Intent for next treatment session: \"Next visit will focus on advancements to more challenging activities\".   Total Treatment Duration:  PT Patient Time In/Time Out  Time In: 1000  Time Out: 1100    Aries Felxi, PT

## 2017-11-29 ENCOUNTER — APPOINTMENT (RX ONLY)
Dept: URBAN - METROPOLITAN AREA CLINIC 349 | Facility: CLINIC | Age: 57
Setting detail: DERMATOLOGY
End: 2017-11-29

## 2017-11-29 DIAGNOSIS — L82.0 INFLAMED SEBORRHEIC KERATOSIS: ICD-10-CM

## 2017-11-29 DIAGNOSIS — L82.1 OTHER SEBORRHEIC KERATOSIS: ICD-10-CM

## 2017-11-29 DIAGNOSIS — L57.8 OTHER SKIN CHANGES DUE TO CHRONIC EXPOSURE TO NONIONIZING RADIATION: ICD-10-CM

## 2017-11-29 PROCEDURE — ? COUNSELING

## 2017-11-29 PROCEDURE — 99203 OFFICE O/P NEW LOW 30 MIN: CPT | Mod: 25

## 2017-11-29 PROCEDURE — ? LIQUID NITROGEN

## 2017-11-29 PROCEDURE — 17110 DESTRUCTION B9 LES UP TO 14: CPT

## 2017-11-29 ASSESSMENT — LOCATION DETAILED DESCRIPTION DERM
LOCATION DETAILED: LEFT LATERAL ABDOMEN
LOCATION DETAILED: RIGHT MEDIAL BREAST 2-3:00 REGION
LOCATION DETAILED: LEFT MID-UPPER BACK
LOCATION DETAILED: LEFT MEDIAL UPPER BACK
LOCATION DETAILED: RIGHT SUPERIOR MEDIAL UPPER BACK

## 2017-11-29 ASSESSMENT — LOCATION SIMPLE DESCRIPTION DERM
LOCATION SIMPLE: ABDOMEN
LOCATION SIMPLE: RIGHT BREAST
LOCATION SIMPLE: LEFT UPPER BACK
LOCATION SIMPLE: RIGHT UPPER BACK

## 2017-11-29 ASSESSMENT — LOCATION ZONE DERM: LOCATION ZONE: TRUNK

## 2017-11-29 NOTE — PROCEDURE: LIQUID NITROGEN
Post-Care Instructions: I reviewed with the patient in detail post-care instructions. Patient is to wear sunprotection, and avoid picking at any of the treated lesions. Pt may apply Vaseline to crusted or scabbing areas.
Render Post-Care Instructions In Note?: no
Include Z78.9 (Other Specified Conditions Influencing Health Status) As An Associated Diagnosis?: Yes
Medical Necessity Information: It is in your best interest to select a reason for this procedure from the list below. All of these items fulfill various CMS LCD requirements except the new and changing color options.
Consent: The patient's consent was obtained including but not limited to risks of crusting, scabbing, blistering, scarring, darker or lighter pigmentary change, recurrence, incomplete removal and infection.
Detail Level: Detailed
Medical Necessity Clause: This procedure was medically necessary because the lesions that were treated were:
Number Of Freeze-Thaw Cycles: 2 freeze-thaw cycles

## 2017-12-04 ENCOUNTER — HOSPITAL ENCOUNTER (OUTPATIENT)
Dept: MAMMOGRAPHY | Age: 57
Discharge: HOME | End: 2017-12-04
Attending: OBSTETRICS & GYNECOLOGY
Payer: COMMERCIAL

## 2017-12-04 DIAGNOSIS — Z12.31 ENCOUNTER FOR SCREENING MAMMOGRAM FOR MALIGNANT NEOPLASM OF BREAST: ICD-10-CM

## 2017-12-04 PROCEDURE — 77067 SCR MAMMO BI INCL CAD: CPT

## 2017-12-05 ENCOUNTER — APPOINTMENT (RX ONLY)
Dept: URBAN - METROPOLITAN AREA CLINIC 349 | Facility: CLINIC | Age: 57
Setting detail: DERMATOLOGY
End: 2017-12-05

## 2017-12-05 DIAGNOSIS — Z41.9 ENCOUNTER FOR PROCEDURE FOR PURPOSES OTHER THAN REMEDYING HEALTH STATE, UNSPECIFIED: ICD-10-CM

## 2017-12-05 PROCEDURE — ? COSMETIC CONSULTATION: SKIN CARE PRODUCTS AND SERVICES

## 2017-12-05 NOTE — PROCEDURE: COSMETIC CONSULTATION: SKIN CARE PRODUCTS AND SERVICES
Price (Use Numbers Only, No Special Characters Or $): 431 Price (Use Numbers Only, No Special Characters Or $): 993

## 2018-12-06 PROBLEM — N39.3 SUI (STRESS URINARY INCONTINENCE, FEMALE): Status: ACTIVE | Noted: 2018-12-06

## 2019-01-15 ENCOUNTER — HOSPITAL ENCOUNTER (OUTPATIENT)
Dept: SURGERY | Age: 59
Discharge: HOME | End: 2019-01-15
Attending: OBSTETRICS & GYNECOLOGY

## 2019-01-15 VITALS
SYSTOLIC BLOOD PRESSURE: 139 MMHG | OXYGEN SATURATION: 100 % | HEIGHT: 64 IN | RESPIRATION RATE: 16 BRPM | WEIGHT: 153 LBS | HEART RATE: 79 BPM | DIASTOLIC BLOOD PRESSURE: 79 MMHG | BODY MASS INDEX: 26.12 KG/M2

## 2019-01-15 NOTE — PERIOP NOTES
Patient verified name and     Order for consent NOT found in EHR - unable to verify procedure at this time. Type 1b surgery, Walk in assessment complete. Labs per surgeon: unknown  Labs per anesthesia protocol: none  EKG: not needed per protocols. Hibiclens and instructions given per hospital policy. Patient provided with and instructed on educational handouts including Guide to Surgery, Pain Management, Hand Hygiene, Blood Transfusion Education, and [facility] Brochure. Patient answered medical/surgical history questions at their best of ability. All prior to admission medications documented in [facility]. Original medication prescription bottle not visualized during patient appointment. Patient instructed to hold all vitamins 7 days prior to surgery and NSAIDS 5 days prior to surgery, patient verbalized understanding. Medications to be held: none    Patient instructed to continue previous medications as prescribed prior to surgery and to take the following medications the day of surgery according to anesthesia guidelines with a small sip of water: allegra prn, mucinex prn. Patient teach back successful and patient demonstrates knowledge of instructions.

## 2019-01-25 RX ORDER — SODIUM CHLORIDE 0.9 % (FLUSH) 0.9 %
5-40 SYRINGE (ML) INJECTION AS NEEDED
Status: CANCELLED | OUTPATIENT
Start: 2019-01-25

## 2019-01-25 RX ORDER — SODIUM CHLORIDE 0.9 % (FLUSH) 0.9 %
5-40 SYRINGE (ML) INJECTION EVERY 8 HOURS
Status: CANCELLED | OUTPATIENT
Start: 2019-01-25

## 2019-01-25 NOTE — H&P
CC: Stress urinary Incontinence HPI: 61 yo female [id number] with JONATHAN. She has had JONATHAN for 4 years. She went to a urologist 2 years ago and decided ultimately to go to PT. PT helped some.  
  
Her initial symptoms were leaking of urine during the day. She leaks with walking and exercise. She wears medium pads, 2 per day. She hawthorne snot wear pads at night. She also leaks with cough laugh sneeze and if she is constipated. Cold weather makes it worse and worse towards the end of the day. She trickles urine. Alcohol makes it worse. 
  
She denies prolapse.  
  
She voids >8 per day and does not get up in the middle of the night to void. Leaks per day: >10 times per day at worst 
BM per week: 1 day per week Caffeine: 2 cups of coffee Sweeteners: 0 Alcohol/ week: 3-4 glasses per week 
  She is sexually active with men, and leaks urine during intercourse. Allergies: Crestor Medications: Colace, mucinex, allergra PMHx: Anxiety, depression, HTN, HSV, high cholesterol PSHx: CD x2, colonoscopy, tonsillectomy Fam Hx:  Dementia, Schizophrenia, colon CA, Heart Dz, Breast CA Soc Hx: ,  in [facility], drinks 1 glass, denies drugs and was a previous smoker. She exercises 1-2 times per week and and drinks 3-4 caffeine drinks per day. ROS: 
Constitutional: Negative for fatigue, fever and weight loss. Skin: Negative for rash and itching. HENT: Negative for hearing loss and vertigo. Respiratory: Negative for cough and shortness of breath. Gastrointestinal: Positive for constipation. Negative for abdominal pain, anal bleeding, diarrhea and bright red blood per rectum. Genitourinary: Positive for enuresis.  Negative for difficulty urinating, dyspareunia, dysuria, frequency, genital sores, hematuria, menstrual problem, pelvic pain, pelvic pressure, urgency, decreased urine volume, vaginal bleeding, vaginal discharge, vaginal pain, vaginal burning, vaginal itching, vaginal odor and vaginal lesion. Neurological: Negative for dizziness, blackouts and headaches. Psychiatric/Behavioral: Negative for behavioral problem, nervous/anxious and insomnia. Endocrine: Negative for polydipsia and polyuria. Female Endocrine: Positive for decreased libidoVaginal dryness and pain with intercourse Cardiovascular: negative for chest pain, palpations, and leg swelling Visit Vitals /70 (BP 1 Location: Right arm, BP Patient Position: Sitting) Ht 5' 4\" (1.626 m) Wt 155 lb (70.3 kg) LMP 07/03/2015 Comment: Dr Steffany Galeana BMI 26.61 kg/m²  
  
  
Physical Exam  
Constitutional: She is oriented to person, place, and time. Vital signs are normal. She appears well-developed and well-nourished. She is cooperative. No distress. HENT:  
Head: Normocephalic and atraumatic. Neck: Trachea normal. No thyroid mass present. Cardiovascular: Normal rate and normal heart sounds. Pulmonary/Chest: Effort normal. No accessory muscle usage. No respiratory distress. Abdominal: Soft. Normal appearance. She exhibits no distension and no mass. There is no hepatosplenomegaly. There is no tenderness. There is no rebound and no guarding. No hernia. Genitourinary: Pelvic exam was performed with patient supine. Musculoskeletal: She exhibits no edema or tenderness. Lymphadenopathy:  
  She has no cervical adenopathy. She has no axillary adenopathy. Neurological: She is alert and oriented to person, place, and time. Skin: Skin is warm and dry. No lesion and no rash noted. No erythema. Psychiatric: She has a normal mood and affect. Her speech is normal and behavior is normal. Judgment and thought content normal. Cognition and memory are normal. She does not express impulsivity or inappropriate judgment.  
  
  
Female Genitourinary Vulva: - Normal. No lesions Bartholin's Gland - Bilateral - Normal, nontender Skenes Gland- Bilateral-Normal, nontender Clitoris - Normal.  
Introitus: Characteristics - Normal. Urethral Meatus: - Normal appearing, normal size, no lesions, no prolapse Urethra: No masses, no tenderness Vagina:  No atrophy, no discharge, no lesions Cervix: - no lesions, no discharge Uterus: -  no tenderness, normal mobility Adnexa: - No masses palpated, no tenderness Bladder - no tenderness, no masses palpated Perineum - no lesions Rectal  
Anorectal Exam: - normal  
  
  
  
POP-Q: (Pelvic Organ Prolapse - Quantification Exam): 
NO POP 
  
Pelvic floor muscles: Tender Spasm     Contraction    
R. Puborectalis: NO 0 /5   4 /5  
L. Puborectalis: NO 0 /5   4 /5  
R. Pubococcyg NO 0 /5   4 /5  
L. Pubococcyg NO 0 /5   4 /5  
R. Ileococcyg: NO 0 /5   4 /5  
L. Ileococcyg: NO 0 /5   4 /5  
R. Obturator Int: NO 0 /5   4 /5  
L. Obturator Int: NO 0 /5   4 /5  
R. Coccygeus: NO 0 /5   4 /5  
L. Coccygeus: NO 0 /5   4 /5  
  
  
  
Supine Stress Test of JONATHAN: 
Positive 
  
Neurological Exam:  
Sensorineural Exam:  
            Bulvocavernosus reflex:  Normal 
            Anal [place]:  Normal 
  
Post Void Residual: 
            Post void residual by bladder scan:  31 ml 
 
JONATHAN (stress urinary incontinence, female) Assessment & Plan: 
We discussed the differential diagnosis of urinary incontinence. We also discussed the pathogenesis and etiology of stress urinary incontinence. I explained the epidemiology of incontinence. I offered her options which include nothing, physical therapy, barrier treatment, and surgery 
  
I described the surgical technique to be employed for her upcoming surgery. We discussed the anticipated postoperative course. 
  
TVT cure rates at 6 years is up to 85%.  
  
Risks, benefits, indications, and alternatives of the surgery were discussed.  Risks reviewed include bleeding, infections, injury to pelvic organs (bladder, nerves, vessels, urethra, and ureters), recurrence, urinary retention, dyspareunia, anesthetic complications, and death. We discussed that other complications could arise and that the list is too long to discuss comprehensively. We also discussed the risk of mesh: erosion, rejection and infection in great detail. She is consented for a midurethral sling and cystoscopy

## 2019-01-28 ENCOUNTER — ANESTHESIA EVENT (OUTPATIENT)
Dept: SURGERY | Age: 59
End: 2019-01-28
Payer: COMMERCIAL

## 2019-01-28 RX ORDER — FENTANYL CITRATE 50 UG/ML
100 INJECTION, SOLUTION INTRAMUSCULAR; INTRAVENOUS ONCE
Status: CANCELLED | OUTPATIENT
Start: 2019-01-28 | End: 2019-01-28

## 2019-01-28 RX ORDER — SODIUM CHLORIDE 9 MG/ML
25 INJECTION, SOLUTION INTRAVENOUS CONTINUOUS
Status: CANCELLED | OUTPATIENT
Start: 2019-01-28 | End: 2019-01-29

## 2019-01-28 RX ORDER — SODIUM CHLORIDE 0.9 % (FLUSH) 0.9 %
5-40 SYRINGE (ML) INJECTION EVERY 8 HOURS
Status: CANCELLED | OUTPATIENT
Start: 2019-01-28

## 2019-01-28 RX ORDER — SODIUM CHLORIDE 0.9 % (FLUSH) 0.9 %
5-40 SYRINGE (ML) INJECTION AS NEEDED
Status: CANCELLED | OUTPATIENT
Start: 2019-01-28

## 2019-01-29 ENCOUNTER — HOSPITAL ENCOUNTER (OUTPATIENT)
Age: 59
Setting detail: OUTPATIENT SURGERY
Discharge: HOME | End: 2019-01-29
Attending: OBSTETRICS & GYNECOLOGY | Admitting: OBSTETRICS & GYNECOLOGY
Payer: COMMERCIAL

## 2019-01-29 ENCOUNTER — ANESTHESIA (OUTPATIENT)
Dept: SURGERY | Age: 59
End: 2019-01-29
Payer: COMMERCIAL

## 2019-01-29 VITALS
RESPIRATION RATE: 16 BRPM | SYSTOLIC BLOOD PRESSURE: 119 MMHG | OXYGEN SATURATION: 96 % | TEMPERATURE: 96.8 F | WEIGHT: 153.8 LBS | DIASTOLIC BLOOD PRESSURE: 60 MMHG | HEART RATE: 85 BPM | BODY MASS INDEX: 26.26 KG/M2 | HEIGHT: 64 IN

## 2019-01-29 DIAGNOSIS — G89.18 POSTOPERATIVE PAIN: Primary | ICD-10-CM

## 2019-01-29 PROCEDURE — 74011250637 HC RX REV CODE- 250/637: Performed by: ANESTHESIOLOGY

## 2019-01-29 PROCEDURE — 77030008467 HC STPLR SKN COVD -B: Performed by: OBSTETRICS & GYNECOLOGY

## 2019-01-29 PROCEDURE — 77030033269 HC SLV COMPR SCD KNE2 CARD -B: Performed by: OBSTETRICS & GYNECOLOGY

## 2019-01-29 PROCEDURE — 77030031139 HC SUT VCRL2 J&J -A: Performed by: OBSTETRICS & GYNECOLOGY

## 2019-01-29 PROCEDURE — 76210000020 HC REC RM PH II FIRST 0.5 HR: Performed by: OBSTETRICS & GYNECOLOGY

## 2019-01-29 PROCEDURE — C1771 REP DEV, URINARY, W/SLING: HCPCS | Performed by: OBSTETRICS & GYNECOLOGY

## 2019-01-29 PROCEDURE — 77030032490 HC SLV COMPR SCD KNE COVD -B: Performed by: OBSTETRICS & GYNECOLOGY

## 2019-01-29 PROCEDURE — 74011000250 HC RX REV CODE- 250: Performed by: OBSTETRICS & GYNECOLOGY

## 2019-01-29 PROCEDURE — 76060000033 HC ANESTHESIA 1 TO 1.5 HR: Performed by: OBSTETRICS & GYNECOLOGY

## 2019-01-29 PROCEDURE — 74011250636 HC RX REV CODE- 250/636

## 2019-01-29 PROCEDURE — 76010000149 HC OR TIME 1 TO 1.5 HR: Performed by: OBSTETRICS & GYNECOLOGY

## 2019-01-29 PROCEDURE — 77030008064 HC RNG RETRCTR COOP -B: Performed by: OBSTETRICS & GYNECOLOGY

## 2019-01-29 PROCEDURE — 74011250636 HC RX REV CODE- 250/636: Performed by: ANESTHESIOLOGY

## 2019-01-29 PROCEDURE — 77030018836 HC SOL IRR NACL ICUM -A: Performed by: OBSTETRICS & GYNECOLOGY

## 2019-01-29 PROCEDURE — 77030002982 HC SUT POLYSRB J&J -A: Performed by: OBSTETRICS & GYNECOLOGY

## 2019-01-29 PROCEDURE — 74011000250 HC RX REV CODE- 250

## 2019-01-29 PROCEDURE — 77030005537 HC CATH URETH BARD -A: Performed by: OBSTETRICS & GYNECOLOGY

## 2019-01-29 PROCEDURE — 77030019927 HC TBNG IRR CYSTO BAXT -A: Performed by: OBSTETRICS & GYNECOLOGY

## 2019-01-29 PROCEDURE — 77030020782 HC GWN BAIR PAWS FLX 3M -B: Performed by: ANESTHESIOLOGY

## 2019-01-29 PROCEDURE — 77030039266 HC ADH SKN EXOFIN S2SG -A: Performed by: OBSTETRICS & GYNECOLOGY

## 2019-01-29 PROCEDURE — 76210000006 HC OR PH I REC 0.5 TO 1 HR: Performed by: OBSTETRICS & GYNECOLOGY

## 2019-01-29 PROCEDURE — 77030018832 HC SOL IRR H20 ICUM -A: Performed by: OBSTETRICS & GYNECOLOGY

## 2019-01-29 PROCEDURE — 77030021052 HC RNG RETRCTR STAY COOP -A: Performed by: OBSTETRICS & GYNECOLOGY

## 2019-01-29 PROCEDURE — 77030010509 HC AIRWY LMA MSK TELE -A: Performed by: ANESTHESIOLOGY

## 2019-01-29 DEVICE — IMPLANTABLE DEVICE: Type: IMPLANTABLE DEVICE | Site: VAGINA | Status: FUNCTIONAL

## 2019-01-29 RX ORDER — LIDOCAINE HYDROCHLORIDE 20 MG/ML
INJECTION, SOLUTION EPIDURAL; INFILTRATION; INTRACAUDAL; PERINEURAL AS NEEDED
Status: DISCONTINUED | OUTPATIENT
Start: 2019-01-29 | End: 2019-01-29 | Stop reason: HOSPADM

## 2019-01-29 RX ORDER — SODIUM CHLORIDE 0.9 % (FLUSH) 0.9 %
5-40 SYRINGE (ML) INJECTION AS NEEDED
Status: DISCONTINUED | OUTPATIENT
Start: 2019-01-29 | End: 2019-01-29 | Stop reason: HOSPADM

## 2019-01-29 RX ORDER — EPHEDRINE SULFATE 50 MG/ML
INJECTION, SOLUTION INTRAVENOUS AS NEEDED
Status: DISCONTINUED | OUTPATIENT
Start: 2019-01-29 | End: 2019-01-29 | Stop reason: HOSPADM

## 2019-01-29 RX ORDER — FAMOTIDINE 20 MG/1
20 TABLET, FILM COATED ORAL ONCE
Status: COMPLETED | OUTPATIENT
Start: 2019-01-29 | End: 2019-01-29

## 2019-01-29 RX ORDER — CEFAZOLIN SODIUM/WATER 2 G/20 ML
2 SYRINGE (ML) INTRAVENOUS ONCE
Status: DISCONTINUED | OUTPATIENT
Start: 2019-01-29 | End: 2019-01-29 | Stop reason: HOSPADM

## 2019-01-29 RX ORDER — FENTANYL CITRATE 50 UG/ML
INJECTION, SOLUTION INTRAMUSCULAR; INTRAVENOUS AS NEEDED
Status: DISCONTINUED | OUTPATIENT
Start: 2019-01-29 | End: 2019-01-29 | Stop reason: HOSPADM

## 2019-01-29 RX ORDER — HYDROMORPHONE HYDROCHLORIDE 2 MG/1
2 TABLET ORAL
Qty: 10 TAB | Refills: 0 | Status: SHIPPED | OUTPATIENT
Start: 2019-01-29 | End: 2019-02-25

## 2019-01-29 RX ORDER — LIDOCAINE HYDROCHLORIDE 10 MG/ML
0.1 INJECTION INFILTRATION; PERINEURAL AS NEEDED
Status: DISCONTINUED | OUTPATIENT
Start: 2019-01-29 | End: 2019-01-29 | Stop reason: HOSPADM

## 2019-01-29 RX ORDER — KETOROLAC TROMETHAMINE 30 MG/ML
INJECTION, SOLUTION INTRAMUSCULAR; INTRAVENOUS AS NEEDED
Status: DISCONTINUED | OUTPATIENT
Start: 2019-01-29 | End: 2019-01-29 | Stop reason: HOSPADM

## 2019-01-29 RX ORDER — MIDAZOLAM HYDROCHLORIDE 1 MG/ML
2 INJECTION, SOLUTION INTRAMUSCULAR; INTRAVENOUS
Status: COMPLETED | OUTPATIENT
Start: 2019-01-29 | End: 2019-01-29

## 2019-01-29 RX ORDER — OXYCODONE HYDROCHLORIDE 5 MG/1
5 TABLET ORAL
Status: DISCONTINUED | OUTPATIENT
Start: 2019-01-29 | End: 2019-01-29 | Stop reason: HOSPADM

## 2019-01-29 RX ORDER — HYDROCODONE BITARTRATE AND ACETAMINOPHEN 7.5; 325 MG/1; MG/1
1 TABLET ORAL AS NEEDED
Status: DISCONTINUED | OUTPATIENT
Start: 2019-01-29 | End: 2019-01-29 | Stop reason: HOSPADM

## 2019-01-29 RX ORDER — DEXAMETHASONE SODIUM PHOSPHATE 4 MG/ML
INJECTION, SOLUTION INTRA-ARTICULAR; INTRALESIONAL; INTRAMUSCULAR; INTRAVENOUS; SOFT TISSUE AS NEEDED
Status: DISCONTINUED | OUTPATIENT
Start: 2019-01-29 | End: 2019-01-29 | Stop reason: HOSPADM

## 2019-01-29 RX ORDER — HYDROMORPHONE HYDROCHLORIDE 2 MG/ML
0.5 INJECTION, SOLUTION INTRAMUSCULAR; INTRAVENOUS; SUBCUTANEOUS
Status: DISCONTINUED | OUTPATIENT
Start: 2019-01-29 | End: 2019-01-29 | Stop reason: HOSPADM

## 2019-01-29 RX ORDER — ONDANSETRON 2 MG/ML
INJECTION INTRAMUSCULAR; INTRAVENOUS AS NEEDED
Status: DISCONTINUED | OUTPATIENT
Start: 2019-01-29 | End: 2019-01-29 | Stop reason: HOSPADM

## 2019-01-29 RX ORDER — SODIUM CHLORIDE, SODIUM LACTATE, POTASSIUM CHLORIDE, CALCIUM CHLORIDE 600; 310; 30; 20 MG/100ML; MG/100ML; MG/100ML; MG/100ML
100 INJECTION, SOLUTION INTRAVENOUS CONTINUOUS
Status: DISCONTINUED | OUTPATIENT
Start: 2019-01-29 | End: 2019-01-29 | Stop reason: HOSPADM

## 2019-01-29 RX ORDER — SODIUM CHLORIDE 0.9 % (FLUSH) 0.9 %
5-40 SYRINGE (ML) INJECTION EVERY 8 HOURS
Status: DISCONTINUED | OUTPATIENT
Start: 2019-01-29 | End: 2019-01-29 | Stop reason: HOSPADM

## 2019-01-29 RX ORDER — NALOXONE HYDROCHLORIDE 0.4 MG/ML
0.1 INJECTION, SOLUTION INTRAMUSCULAR; INTRAVENOUS; SUBCUTANEOUS AS NEEDED
Status: DISCONTINUED | OUTPATIENT
Start: 2019-01-29 | End: 2019-01-29 | Stop reason: HOSPADM

## 2019-01-29 RX ORDER — PROPOFOL 10 MG/ML
INJECTION, EMULSION INTRAVENOUS AS NEEDED
Status: DISCONTINUED | OUTPATIENT
Start: 2019-01-29 | End: 2019-01-29 | Stop reason: HOSPADM

## 2019-01-29 RX ORDER — BUPIVACAINE HYDROCHLORIDE AND EPINEPHRINE 5; 5 MG/ML; UG/ML
INJECTION, SOLUTION EPIDURAL; INTRACAUDAL; PERINEURAL AS NEEDED
Status: DISCONTINUED | OUTPATIENT
Start: 2019-01-29 | End: 2019-01-29 | Stop reason: HOSPADM

## 2019-01-29 RX ADMIN — LIDOCAINE HYDROCHLORIDE 0.1 ML: 10 INJECTION, SOLUTION INFILTRATION; PERINEURAL at 11:07

## 2019-01-29 RX ADMIN — ONDANSETRON 4 MG: 2 INJECTION INTRAMUSCULAR; INTRAVENOUS at 12:28

## 2019-01-29 RX ADMIN — DEXAMETHASONE SODIUM PHOSPHATE 10 MG: 4 INJECTION, SOLUTION INTRA-ARTICULAR; INTRALESIONAL; INTRAMUSCULAR; INTRAVENOUS; SOFT TISSUE at 12:28

## 2019-01-29 RX ADMIN — FAMOTIDINE 20 MG: 20 TABLET ORAL at 11:00

## 2019-01-29 RX ADMIN — LIDOCAINE HYDROCHLORIDE 80 MG: 20 INJECTION, SOLUTION EPIDURAL; INFILTRATION; INTRACAUDAL; PERINEURAL at 12:28

## 2019-01-29 RX ADMIN — EPHEDRINE SULFATE 10 MG: 50 INJECTION, SOLUTION INTRAVENOUS at 12:52

## 2019-01-29 RX ADMIN — SODIUM CHLORIDE, SODIUM LACTATE, POTASSIUM CHLORIDE, AND CALCIUM CHLORIDE 100 ML/HR: 600; 310; 30; 20 INJECTION, SOLUTION INTRAVENOUS at 11:08

## 2019-01-29 RX ADMIN — PROPOFOL 200 MG: 10 INJECTION, EMULSION INTRAVENOUS at 12:28

## 2019-01-29 RX ADMIN — FENTANYL CITRATE 50 MCG: 50 INJECTION, SOLUTION INTRAMUSCULAR; INTRAVENOUS at 12:28

## 2019-01-29 RX ADMIN — MIDAZOLAM 2 MG: 1 INJECTION INTRAMUSCULAR; INTRAVENOUS at 11:38

## 2019-01-29 RX ADMIN — EPHEDRINE SULFATE 10 MG: 50 INJECTION, SOLUTION INTRAVENOUS at 12:30

## 2019-01-29 RX ADMIN — KETOROLAC TROMETHAMINE 30 MG: 30 INJECTION, SOLUTION INTRAMUSCULAR; INTRAVENOUS at 13:15

## 2019-01-29 RX ADMIN — FENTANYL CITRATE 50 MCG: 50 INJECTION, SOLUTION INTRAMUSCULAR; INTRAVENOUS at 12:45

## 2019-01-29 NOTE — OP NOTES
PROCEDURES PERFORMED:  1.. Mid urethral sling (tension-free vaginal tape). 2. Cystourethroscopy. PREOPERATIVE DIAGNOSES:  1. Stress urinary incontinence. POSTOPERATIVE DIAGNOSES:  1. Stress urinary incontinence. EBL: 50cc    IVF: 1500cc    UO: 200cc    INTRAOPERATIVE FINDINGS: Intraoperative cystourethroscopy revealed normal bladder and urethral mucosa without evidence of laceration, foreign body, neoplasm, or stone. Both ureters were effluxing urine at the conclusion of the case. INDICATIONS FOR PROCEDURE: This is a 65-year-old female with a history of worsening symptomatic  stress urinary incontinence who desired definitive surgical treatment after being extensively counseled on the risks, benefits, indications and alternatives of the procedure. Risks reviewed include bleeding, infection, damage to the bladder, ureters, urethra, bowel, blood vessels, nerves, postoperative urinary retention, postoperative incontinence, pelvic pain, dyspareunia, recurrence, mesh erosion, anesthetic complications and death. The patient expressed understanding and informed consent was obtained. DESCRIPTION OF PROCEDURE: On the day of surgery, the patient was identified in the preop waiting area. Consents were again reviewed. The patient was then taken to the operating room where she was placed in dorsal lithotomy position using the Yellofin stirrups in neurologically safe position. Antithrombotic boots were activated. General anesthesia was induced without difficulty. She was prepped and draped in usual sterile fashion. Timeout was taken to review the patient's procedure and confirm she received appropriate antibiotics. Demarco catheter was placed in the bladder to drain it. At this point, we performed the mid urethral sling as follows: The anterior vaginal mucosa overlying the mid urethral region was superficially injected with 0.5% bubivicaine, 1:100,000 epinephrine.  A vertical midline incision was made approximately 2 cm in length with a scalpel. Metzenbaum scissors were then used to dissect the vaginal mucosa off the underlying fascia and bladder out underneath the pubic symphysis bilaterally. TVT trocar was then placed through the vaginal tunnel underneath the pubic symphysis through the retropubic space and out through the anterior abdominal wall just above the pubic symphysis. This process was then repeated on the contralateral side following the same anatomic principles. Demarco catheter and stylet were removed. Cystourethroscopy was then performed with the above noted findings. The scope was then removed. The catheter was replaced. Trocars and TVT polypropylene tape were advanced out through the retropubic space and through the abdominal skin incisions. Placement was confirmed to be in the midurethral region and not under tension using a spacer. Plastic sheaths were then removed and the ends of tape were cut just below the abdominal incision. Placement was again confirmed to be not under tension using a spacer. Following this, the incision was then irrigated and appeared hemostatic. The vaginal mucosa was then closed with running 2-0 Vicryl suture. Excellent hemostasis was noted. Suprapubic skin incisions were closed with Dermabond. Rectal exam was performed and was normal. Sponge, lap, needle and instrument counts were correct. The patient was repositioned in supine position and anesthesia was reversed without difficulty. The patient tolerated procedure well and was taken to recovery in stable condition.

## 2019-01-29 NOTE — ANESTHESIA PREPROCEDURE EVALUATION
Anesthetic History PONV Review of Systems / Medical History Patient summary reviewed Pulmonary Neuro/Psych Psychiatric history (Depression) Cardiovascular Hypertension Hyperlipidemia Exercise tolerance: >4 METS 
  
GI/Hepatic/Renal 
  
 
 
 
 
 
 Endo/Other Other Findings Physical Exam 
 
Airway Mallampati: II 
TM Distance: > 6 cm Neck ROM: normal range of motion Mouth opening: Normal 
 
 Cardiovascular Regular rate and rhythm,  S1 and S2 normal,  no murmur, click, rub, or gallop Dental 
No notable dental hx Pulmonary Breath sounds clear to auscultation Abdominal 
 
 
 
 Other Findings Anesthetic Plan ASA: 2 Anesthesia type: general 
 
 
 
 
 
Anesthetic plan and risks discussed with: Patient

## 2019-01-29 NOTE — ANESTHESIA POSTPROCEDURE EVALUATION
Procedure(s): 
BO[employer]ON SCIENTIFIC SLING 
CYSTOSCOPY. Anesthesia Post Evaluation Patient location during evaluation: PACU Patient participation: complete - patient participated Level of consciousness: awake and alert Pain management: adequate Airway patency: patent Anesthetic complications: no 
Cardiovascular status: acceptable Respiratory status: acceptable Hydration status: acceptable Post anesthesia nausea and vomiting:  none Visit Vitals /71 Pulse 83 Temp 36 °C (96.8 °F) Resp 16 Ht 5' 4\" (1.626 m) Wt 69.8 kg (153 lb 12.8 oz) SpO2 97% BMI 26.40 kg/m²

## 2019-01-29 NOTE — DISCHARGE INSTRUCTIONS
Urethral Vaginal Sling    A urethral vaginal sling procedure is surgery to correct urinary incontinence. Urinary incontinence is uncontrolled loss of urine. It is common in women who have had children and in older women. In this surgery, a strong piece of material is placed under the tube that drains the bladder (urethra). This sling is made of tension-free vaginal tape or nylon mesh. It fits under the urethra like a hammock. The sling is put in position to straighten, support, and hold the urethra in its normal position. Tell a health care provider about:   Any allergies you have.  All medicines you are taking, including vitamins, herbs, eye drops, creams, and over the counter medicines.  Any problems you or your family members have had with anesthetic medicines.  Any blood disorders you have.  Any surgeries you have had.  Any medical conditions you have. What are the risks? Generally, this is a safe procedure. However, as with any procedure, complications can occur. Possible complications include:   Infection   Excessive bleeding   Damage to other organs   Problems urinating properly for several days or weeks   Problems from the use of anesthetics   Return of the urinary incontinence   Mesh exposure    What happens before the procedure?  Ask your health care provider about changing or stopping your regular medications. You may need to stop taking certain medicines 1 week before surgery   DO NOT eat or drink anything for 6-8 hours before the surgery   If you smoke, DO NOT  smoke for at least 2 weeks before the surgery   Make plans to have someone drive you home after your hospital stay. Also arrange for someone to help you with activities during recovery. What happens during the procedure?  You will have general or spinal anesthesia. With general anesthesia, you are asleep and will feel no pain. With spinal anesthesia, you are numb from the waist down, but you will still be awake.  A catheter is placed in your bladder to drain urine during the procedure   An incision is made in your vagina and low on your belly in the hairline.  The sling material is passed around your bladder neck and sutured to the muscles to hold the urethra in its normal position.  The incisions are closed  What happens after the procedure?  You will be taken to a recovery area where your progress will be monitored closely. Your breathing, blood pressure, and pulse (vital signs) will be checked often. When you are stable, you will be moved to a regular hospital room   You will have a catheter in place to drain your bladder. This will stay in place until your bladder is working properly on its own. Other complications requiring evaluation:   Bleeding: sometimes bleeding occurs where the mesh travels behind the pelvic bone and can forma hematoma (firm area of blood). The bleeding usually stops on its own and your body will absorb it. If you have bruising or pressure in the vaginal area, alert your doctor.  Voiding difficulties: after sling surgery, many women have trouble urinating just after surgery. This may be due to swelling near the urethra and means you will need to use a catheter temporarily. These changes are not permanent. Call your doctors office if you have trouble urinating.  Mesh exposure: the synthetic mesh under the urethra may become visible in the vagina. This may cause vaginal discharge and require further surgery to correct. If your doctor sees mesh in your vagina during your follow-up exam, the two of you will discuss what to do next. This information is not intended to replace advice given to you by your health care provider. Make sure you discuss any questions you have with your health care provider.

## 2019-03-09 ENCOUNTER — HOSPITAL ENCOUNTER (OUTPATIENT)
Dept: MAMMOGRAPHY | Age: 59
Discharge: HOME | End: 2019-03-09
Attending: OBSTETRICS & GYNECOLOGY
Payer: COMMERCIAL

## 2019-03-09 DIAGNOSIS — Z12.31 VISIT FOR SCREENING MAMMOGRAM: ICD-10-CM

## 2019-03-09 PROCEDURE — 77063 BREAST TOMOSYNTHESIS BI: CPT

## 2020-07-31 ENCOUNTER — HOSPITAL ENCOUNTER (OUTPATIENT)
Dept: MAMMOGRAPHY | Age: 60
Discharge: HOME | End: 2020-07-31
Attending: OBSTETRICS & GYNECOLOGY
Payer: COMMERCIAL

## 2020-07-31 DIAGNOSIS — Z12.31 VISIT FOR SCREENING MAMMOGRAM: ICD-10-CM

## 2020-07-31 PROCEDURE — 77063 BREAST TOMOSYNTHESIS BI: CPT

## 2020-10-01 ENCOUNTER — APPOINTMENT (RX ONLY)
Dept: URBAN - METROPOLITAN AREA CLINIC 23 | Facility: CLINIC | Age: 60
Setting detail: DERMATOLOGY
End: 2020-10-01

## 2020-10-01 VITALS — TEMPERATURE: 97.2 F

## 2020-10-01 DIAGNOSIS — L82.1 OTHER SEBORRHEIC KERATOSIS: ICD-10-CM

## 2020-10-01 DIAGNOSIS — D22 MELANOCYTIC NEVI: ICD-10-CM

## 2020-10-01 DIAGNOSIS — L57.8 OTHER SKIN CHANGES DUE TO CHRONIC EXPOSURE TO NONIONIZING RADIATION: ICD-10-CM

## 2020-10-01 DIAGNOSIS — L30.9 DERMATITIS, UNSPECIFIED: ICD-10-CM

## 2020-10-01 PROBLEM — D22.5 MELANOCYTIC NEVI OF TRUNK: Status: ACTIVE | Noted: 2020-10-01

## 2020-10-01 PROCEDURE — 99203 OFFICE O/P NEW LOW 30 MIN: CPT

## 2020-10-01 PROCEDURE — ? COUNSELING

## 2020-10-01 PROCEDURE — ? TREATMENT REGIMEN

## 2020-10-01 ASSESSMENT — LOCATION SIMPLE DESCRIPTION DERM
LOCATION SIMPLE: ABDOMEN
LOCATION SIMPLE: CHEST
LOCATION SIMPLE: RIGHT UPPER BACK
LOCATION SIMPLE: UPPER BACK
LOCATION SIMPLE: RIGHT SHOULDER

## 2020-10-01 ASSESSMENT — LOCATION DETAILED DESCRIPTION DERM
LOCATION DETAILED: UPPER STERNUM
LOCATION DETAILED: RIGHT RIB CAGE
LOCATION DETAILED: SUBXIPHOID
LOCATION DETAILED: RIGHT ANTERIOR SHOULDER
LOCATION DETAILED: SUPERIOR THORACIC SPINE
LOCATION DETAILED: RIGHT SUPERIOR UPPER BACK

## 2020-10-01 ASSESSMENT — LOCATION ZONE DERM
LOCATION ZONE: TRUNK
LOCATION ZONE: ARM

## 2021-05-06 PROBLEM — Z12.31 VISIT FOR SCREENING MAMMOGRAM: Status: ACTIVE | Noted: 2021-05-06

## 2021-06-05 PROBLEM — Z12.31 VISIT FOR SCREENING MAMMOGRAM: Status: RESOLVED | Noted: 2021-05-06 | Resolved: 2021-06-05

## 2021-10-07 ENCOUNTER — APPOINTMENT (RX ONLY)
Dept: URBAN - METROPOLITAN AREA CLINIC 24 | Facility: CLINIC | Age: 61
Setting detail: DERMATOLOGY
End: 2021-10-07

## 2021-10-07 DIAGNOSIS — L82.0 INFLAMED SEBORRHEIC KERATOSIS: ICD-10-CM

## 2021-10-07 DIAGNOSIS — D22 MELANOCYTIC NEVI: ICD-10-CM

## 2021-10-07 DIAGNOSIS — L57.8 OTHER SKIN CHANGES DUE TO CHRONIC EXPOSURE TO NONIONIZING RADIATION: ICD-10-CM

## 2021-10-07 DIAGNOSIS — Z71.89 OTHER SPECIFIED COUNSELING: ICD-10-CM

## 2021-10-07 DIAGNOSIS — L29.8 OTHER PRURITUS: ICD-10-CM

## 2021-10-07 DIAGNOSIS — L29.89 OTHER PRURITUS: ICD-10-CM

## 2021-10-07 PROBLEM — D22.5 MELANOCYTIC NEVI OF TRUNK: Status: ACTIVE | Noted: 2021-10-07

## 2021-10-07 PROCEDURE — 17110 DESTRUCTION B9 LES UP TO 14: CPT

## 2021-10-07 PROCEDURE — ? SUNSCREEN RECOMMENDATIONS

## 2021-10-07 PROCEDURE — 99213 OFFICE O/P EST LOW 20 MIN: CPT | Mod: 25

## 2021-10-07 PROCEDURE — ? LIQUID NITROGEN

## 2021-10-07 PROCEDURE — ? TREATMENT REGIMEN

## 2021-10-07 PROCEDURE — ? COUNSELING

## 2021-10-07 ASSESSMENT — LOCATION DETAILED DESCRIPTION DERM
LOCATION DETAILED: RIGHT SUPERIOR UPPER BACK
LOCATION DETAILED: RIGHT SUPERIOR MEDIAL UPPER BACK
LOCATION DETAILED: LEFT MEDIAL UPPER BACK
LOCATION DETAILED: LEFT RIB CAGE

## 2021-10-07 ASSESSMENT — LOCATION SIMPLE DESCRIPTION DERM
LOCATION SIMPLE: LEFT UPPER BACK
LOCATION SIMPLE: RIGHT UPPER BACK
LOCATION SIMPLE: ABDOMEN

## 2021-10-07 ASSESSMENT — LOCATION ZONE DERM: LOCATION ZONE: TRUNK

## 2021-10-07 NOTE — PROCEDURE: COUNSELING
Detail Level: Zone
Detail Level: Simple
Detail Level: Detailed
Sunscreen Recommendations: Broad Spectrum

## 2021-10-07 NOTE — PROCEDURE: LIQUID NITROGEN
Include Z78.9 (Other Specified Conditions Influencing Health Status) As An Associated Diagnosis?: No
Number Of Freeze-Thaw Cycles: 1 freeze-thaw cycle
Post-Care Instructions: I reviewed with the patient in detail post-care instructions. Patient is to wear sunprotection, and avoid picking at any of the treated lesions. Pt may apply Vaseline to crusted or scabbing areas.
Consent: The patient's verbal consent was obtained including but not limited to risks of crusting, scabbing, blistering, scarring, darker or lighter pigmentary change, recurrence, incomplete removal and infection.
Show Topical Anesthesia Variable?: Yes
Medical Necessity Clause: Treatment was medically necessary because the spot was
Medical Necessity Information: It is in your best interest to select a reason for this procedure from the list below. All of these items fulfill various CMS LCD requirements except the new and changing color options.
Detail Level: Detailed

## 2022-03-18 PROBLEM — R30.0 DYSURIA: Status: ACTIVE | Noted: 2017-02-22

## 2022-03-18 PROBLEM — R32 INCONTINENCE: Status: ACTIVE | Noted: 2017-02-22

## 2022-03-20 PROBLEM — N39.3 SUI (STRESS URINARY INCONTINENCE, FEMALE): Status: ACTIVE | Noted: 2018-12-06

## 2022-03-28 ENCOUNTER — TRANSCRIBE ORDER (OUTPATIENT)
Dept: SCHEDULING | Age: 62
End: 2022-03-28

## 2022-03-28 DIAGNOSIS — Z12.31 ENCOUNTER FOR SCREENING MAMMOGRAM FOR MALIGNANT NEOPLASM OF BREAST: Primary | ICD-10-CM

## 2022-04-01 ENCOUNTER — HOSPITAL ENCOUNTER (OUTPATIENT)
Dept: MAMMOGRAPHY | Age: 62
Discharge: HOME | End: 2022-04-01
Attending: OBSTETRICS & GYNECOLOGY
Payer: COMMERCIAL

## 2022-04-01 DIAGNOSIS — Z12.31 ENCOUNTER FOR SCREENING MAMMOGRAM FOR MALIGNANT NEOPLASM OF BREAST: ICD-10-CM

## 2022-04-01 PROCEDURE — 77063 BREAST TOMOSYNTHESIS BI: CPT

## 2023-01-26 ENCOUNTER — APPOINTMENT (RX ONLY)
Dept: URBAN - METROPOLITAN AREA CLINIC 24 | Facility: CLINIC | Age: 63
Setting detail: DERMATOLOGY
End: 2023-01-26

## 2023-01-26 DIAGNOSIS — D22 MELANOCYTIC NEVI: ICD-10-CM

## 2023-01-26 DIAGNOSIS — L73.8 OTHER SPECIFIED FOLLICULAR DISORDERS: ICD-10-CM

## 2023-01-26 DIAGNOSIS — L82.0 INFLAMED SEBORRHEIC KERATOSIS: ICD-10-CM

## 2023-01-26 DIAGNOSIS — Z71.89 OTHER SPECIFIED COUNSELING: ICD-10-CM

## 2023-01-26 DIAGNOSIS — L57.8 OTHER SKIN CHANGES DUE TO CHRONIC EXPOSURE TO NONIONIZING RADIATION: ICD-10-CM

## 2023-01-26 PROBLEM — D22.5 MELANOCYTIC NEVI OF TRUNK: Status: ACTIVE | Noted: 2023-01-26

## 2023-01-26 PROCEDURE — ? LIQUID NITROGEN

## 2023-01-26 PROCEDURE — 99213 OFFICE O/P EST LOW 20 MIN: CPT | Mod: 25

## 2023-01-26 PROCEDURE — ? COUNSELING

## 2023-01-26 PROCEDURE — 17110 DESTRUCTION B9 LES UP TO 14: CPT

## 2023-01-26 PROCEDURE — ? SUNSCREEN RECOMMENDATIONS

## 2023-01-26 ASSESSMENT — LOCATION SIMPLE DESCRIPTION DERM
LOCATION SIMPLE: RIGHT UPPER BACK
LOCATION SIMPLE: LEFT UPPER BACK
LOCATION SIMPLE: RIGHT CHEEK
LOCATION SIMPLE: RIGHT SHOULDER
LOCATION SIMPLE: LEFT SHOULDER

## 2023-01-26 ASSESSMENT — LOCATION DETAILED DESCRIPTION DERM
LOCATION DETAILED: LEFT POSTERIOR SHOULDER
LOCATION DETAILED: RIGHT POSTERIOR SHOULDER
LOCATION DETAILED: RIGHT INFERIOR LATERAL MALAR CHEEK
LOCATION DETAILED: LEFT MEDIAL UPPER BACK
LOCATION DETAILED: RIGHT SUPERIOR UPPER BACK

## 2023-01-26 ASSESSMENT — LOCATION ZONE DERM
LOCATION ZONE: ARM
LOCATION ZONE: TRUNK
LOCATION ZONE: FACE

## 2023-01-26 NOTE — PROCEDURE: COUNSELING
Detail Level: Simple
Detail Level: Zone
Sunscreen Recommendations: Broad Spectrum
Detail Level: Detailed

## 2023-01-26 NOTE — PROCEDURE: LIQUID NITROGEN
Render Post-Care Instructions In Note?: no
Spray Paint Text: The liquid nitrogen was applied to the skin utilizing a spray paint frosting technique.
Number Of Freeze-Thaw Cycles: 3 freeze-thaw cycles
Show Applicator Variable?: Yes
Medical Necessity Clause: This procedure was medically necessary because the lesions that were treated were:
Aperture Size (Optional): C
Consent: The patient's consent was obtained including but not limited to risks of crusting, scabbing, blistering, scarring, darker or lighter pigmentary change, recurrence, incomplete removal and infection.
Detail Level: Detailed
Post-Care Instructions: I reviewed with the patient in detail post-care instructions. Patient is to wear sunprotection, and avoid picking at any of the treated lesions. Pt may apply Vaseline to crusted or scabbing areas.
Medical Necessity Information: It is in your best interest to select a reason for this procedure from the list below. All of these items fulfill various CMS LCD requirements except the new and changing color options.

## 2023-04-24 ENCOUNTER — OFFICE VISIT (OUTPATIENT)
Dept: ORTHOPEDIC SURGERY | Age: 63
End: 2023-04-24

## 2023-04-24 VITALS — HEIGHT: 64 IN | BODY MASS INDEX: 25.1 KG/M2 | WEIGHT: 147 LBS

## 2023-04-24 DIAGNOSIS — M79.672 LEFT FOOT PAIN: Primary | ICD-10-CM

## 2023-04-24 DIAGNOSIS — S99.922A INJURY OF LEFT FOOT, INITIAL ENCOUNTER: ICD-10-CM

## 2023-04-24 DIAGNOSIS — M21.619 BUNION: ICD-10-CM

## 2023-04-24 NOTE — PROGRESS NOTES
Name: Laci Dacosta  YOB: 1960  Gender: female  MRN: [id number]     CC: Right bunion: Left third toe injury    HPI:   February 2023: Solomon Oswald left third toe with persistent pain  04/24/2023: Right bunion: Left third toe injury/pain    ROS/Meds/PSH/PMH/FH/SH: reviewed today    Tobacco:  reports that she has quit smoking. She has never used smokeless tobacco.     Physical Examination:  Patient appears to be alert and oriented with acceptable appearance. No obvious distress or SOB  CV: appears to have acceptable vascular color and capillary refill  Neuro: appears to have mostly intact light touch sensation   Skin: Left third DIP thickening; no open lesions  MS: Standing: Right bunion: Tibial lesser toes: Gait good  Right = bunion with 1-5 tibial toes; 3-4 interdigital click but no pain  Left = third DIP centered pain; 3-4 interdigital click but no pain  Bilateral = full ankle/foot motor; 5/5 strength    XR: Right: Left: Standing AP lateral oblique feet taken today with prior bunionectomy left with retained first metatarsal head screw; bunionette; right bunion; bilateral tarsometatarsal arthritis; bilateral lesser toe tibial deviation; left 3rd toe suspected DIP impacted fracture  XR Impression:  As above      Reviewed Test/Records/Documents:   09/28/2015: Left chevron/Akin bunionectomy: 2-3 claw toe resections    Assessment:    Right bunion, tibial 1-5 toes  Left third toe injury suspected DIP impacted fracture    Plan:   The patient and I discussed the above assessment. We explored treatment options.     Right foot deformity is not limiting except for shoes     The main reason for her visit = 3rd toe pain  I believe she injured her third toe DIP  Her images with prior PIP resection with suspected impacted DIP fracture  She is feeling better so at this time she is content self-treating  No indication today for surgery    Advanced medical imaging: No indication for MRI scan  DME: Toe sleeve  We

## 2023-04-24 NOTE — PROGRESS NOTES
The patient was prescribed and fitted with a silicone toe sleeve for the left foot, size small. Patient read and signed documenting they understand and agree to [facility]'s current DME return policy.

## 2023-05-01 ENCOUNTER — OFFICE VISIT (OUTPATIENT)
Dept: ORTHOPEDIC SURGERY | Age: 63
End: 2023-05-01
Payer: COMMERCIAL

## 2023-05-01 VITALS — WEIGHT: 147 LBS | BODY MASS INDEX: 25.1 KG/M2 | HEIGHT: 64 IN

## 2023-05-01 DIAGNOSIS — M79.672 LEFT FOOT PAIN: Primary | ICD-10-CM

## 2023-05-01 PROCEDURE — 99213 OFFICE O/P EST LOW 20 MIN: CPT | Performed by: ORTHOPAEDIC SURGERY

## 2023-05-01 NOTE — PROGRESS NOTES
Name: Julio César Anderson  YOB: 1960  Gender: female  MRN: [id number]     05/01/2023: Here to assess second toe callus    HPI:   February 2023: Alex Baba left third toe with persistent pain  04/24/2023: Right bunion: Left third toe injury/pain    ROS/Meds/PSH/PMH/FH/SH: reviewed today    Tobacco:  reports that she has quit smoking. She has never used smokeless tobacco.     Physical Examination:  Patient appears to be alert and oriented with acceptable appearance. No obvious distress or SOB  CV: appears to have acceptable vascular color and capillary refill  Neuro: appears to have mostly intact light touch sensation   Skin: Left second DIP thickening; no open lesions, but interdigital clavus  MS: Standing: Right bunion: Tibial lesser toes: Gait good  Right = bunion with 1-5 tibial toes; 3-4 interdigital click but no pain  Left = second toe fibular DIP centered pain/clavus; 3-4 interdigital click but no pain  Bilateral = full ankle/foot motor; 5/5 strength    XR: Right: Left: Standing AP lateral oblique feet taken 04/24/2023 with prior bunionectomy left with retained first metatarsal head screw; bunionette; right bunion; bilateral tarsometatarsal arthritis; bilateral lesser toe tibial deviation; left 3rd toe suspected DIP impacted fracture  XR Impression:  As above      Reviewed Test/Records/Documents:   09/28/2015: Left chevron/Akin bunionectomy: 2-3 claw toe resections    Assessment:    Right bunion, tibial 1-5 toes  Left third toe injury suspected DIP impacted fracture  Left second toe DIP thickening/arthritis with interdigital clavus    Plan:   The patient and I discussed the above assessment. We explored treatment options.     Her third toe seems to be doing well but her problem now is the second toe interdigital clavus  I discussed home treatment and if no resolution, consideration for DIP resection   No indication today for surgery    Advanced medical imaging: No indication for MRI scan  DME:

## 2023-07-11 ENCOUNTER — HOSPITAL ENCOUNTER (OUTPATIENT)
Dept: MAMMOGRAPHY | Age: 63
Discharge: HOME | End: 2023-07-14
Payer: COMMERCIAL

## 2023-07-11 DIAGNOSIS — Z12.31 ENCOUNTER FOR SCREENING MAMMOGRAM FOR BREAST CANCER: ICD-10-CM

## 2023-07-11 PROCEDURE — 77063 BREAST TOMOSYNTHESIS BI: CPT

## 2023-07-11 PROCEDURE — 77067 SCR MAMMO BI INCL CAD: CPT

## 2023-08-14 SDOH — ECONOMIC STABILITY: FOOD INSECURITY: WITHIN THE PAST 12 MONTHS, THE FOOD YOU BOUGHT JUST DIDN'T LAST AND YOU DIDN'T HAVE MONEY TO GET MORE.: NEVER TRUE

## 2023-08-14 SDOH — ECONOMIC STABILITY: TRANSPORTATION INSECURITY
IN THE PAST 12 MONTHS, HAS LACK OF TRANSPORTATION KEPT YOU FROM MEETINGS, WORK, OR FROM GETTING THINGS NEEDED FOR DAILY LIVING?: NO

## 2023-08-14 SDOH — ECONOMIC STABILITY: FOOD INSECURITY: WITHIN THE PAST 12 MONTHS, YOU WORRIED THAT YOUR FOOD WOULD RUN OUT BEFORE YOU GOT MONEY TO BUY MORE.: NEVER TRUE

## 2023-08-14 SDOH — ECONOMIC STABILITY: HOUSING INSECURITY
IN THE LAST 12 MONTHS, WAS THERE A TIME WHEN YOU DID NOT HAVE A STEADY PLACE TO SLEEP OR SLEPT IN A SHELTER (INCLUDING NOW)?: NO

## 2023-08-14 SDOH — ECONOMIC STABILITY: INCOME INSECURITY: HOW HARD IS IT FOR YOU TO PAY FOR THE VERY BASICS LIKE FOOD, HOUSING, MEDICAL CARE, AND HEATING?: NOT VERY HARD

## 2023-08-15 ENCOUNTER — OFFICE VISIT (OUTPATIENT)
Dept: OBGYN CLINIC | Age: 63
End: 2023-08-15
Payer: COMMERCIAL

## 2023-08-15 VITALS
DIASTOLIC BLOOD PRESSURE: 78 MMHG | SYSTOLIC BLOOD PRESSURE: 124 MMHG | BODY MASS INDEX: 25.95 KG/M2 | WEIGHT: 152 LBS | HEIGHT: 64 IN

## 2023-08-15 DIAGNOSIS — R82.998 LEUKOCYTES IN URINE: ICD-10-CM

## 2023-08-15 DIAGNOSIS — R30.0 DYSURIA: ICD-10-CM

## 2023-08-15 DIAGNOSIS — N30.00 ACUTE CYSTITIS WITHOUT HEMATURIA: ICD-10-CM

## 2023-08-15 DIAGNOSIS — Z12.4 CERVICAL CANCER SCREENING: ICD-10-CM

## 2023-08-15 DIAGNOSIS — Z01.419 ENCOUNTER FOR WELL WOMAN EXAM WITH ROUTINE GYNECOLOGICAL EXAM: Primary | ICD-10-CM

## 2023-08-15 LAB
BILIRUBIN, URINE, POC: NEGATIVE
BLOOD URINE, POC: ABNORMAL
GLUCOSE URINE, POC: NEGATIVE
KETONES, URINE, POC: NEGATIVE
LEUKOCYTE ESTERASE, URINE, POC: ABNORMAL
NITRITE, URINE, POC: NEGATIVE
PH, URINE, POC: 6.5 (ref 4.6–8)
PROTEIN,URINE, POC: NEGATIVE
SPECIFIC GRAVITY, URINE, POC: 1.02 (ref 1–1.03)
URINALYSIS CLARITY, POC: ABNORMAL
URINALYSIS COLOR, POC: YELLOW
UROBILINOGEN, POC: ABNORMAL

## 2023-08-15 PROCEDURE — 3078F DIAST BP <80 MM HG: CPT | Performed by: OBSTETRICS & GYNECOLOGY

## 2023-08-15 PROCEDURE — 3074F SYST BP LT 130 MM HG: CPT | Performed by: OBSTETRICS & GYNECOLOGY

## 2023-08-15 PROCEDURE — 81003 URINALYSIS AUTO W/O SCOPE: CPT | Performed by: OBSTETRICS & GYNECOLOGY

## 2023-08-15 PROCEDURE — 99396 PREV VISIT EST AGE 40-64: CPT | Performed by: OBSTETRICS & GYNECOLOGY

## 2023-08-15 RX ORDER — PRAVASTATIN SODIUM 40 MG
40 TABLET ORAL
COMMUNITY
Start: 2022-11-28

## 2023-08-15 RX ORDER — NITROFURANTOIN 25; 75 MG/1; MG/1
100 CAPSULE ORAL 2 TIMES DAILY
Qty: 20 CAPSULE | Refills: 0 | Status: SHIPPED | OUTPATIENT
Start: 2023-08-15

## 2023-08-15 NOTE — PROGRESS NOTES
HPI    Raquel Olguin is a 58 y.o. female seen for annual GYN exam.  She has symptoms of UTI    Past Medical History, Past Surgical History, Family history, Social History, and Medications were all reviewed with the patient today and updated as necessary. Current Outpatient Medications   Medication Sig    pravastatin (PRAVACHOL) 40 MG tablet Take 1 tablet by mouth    nitrofurantoin, macrocrystal-monohydrate, (MACROBID) 100 MG capsule Take 1 capsule by mouth 2 times daily     No current facility-administered medications for this visit. Allergies   Allergen Reactions    Rosuvastatin Other (See Comments)     Elevated liver enyzmes     Past Medical History:   Diagnosis Date    Anxiety     Depression     Essential hypertension, benign 2015    HSV (herpes simplex virus) anogenital infection     Incontinence without sensory awareness     Menopause     Mixed hyperlipidemia 2015    Nausea & vomiting      Past Surgical History:   Procedure Laterality Date     SECTION      x 2    COLONOSCOPY  , 21    ORTHOPEDIC SURGERY      bunion and hammer toes left foot    SLING OPER STRES INCONTINENCE  2019    TONSILLECTOMY       Family History   Problem Relation Age of Onset    Dementia Mother     Schizophrenia Mother     Cancer Mother         Colon    Heart Disease Father         CAD    Cancer Maternal Grandmother         ovarian    Breast Cancer Maternal Grandmother 72    Heart Disease Maternal Grandfather     Ovarian Cancer Neg Hx       Social History     Tobacco Use    Smoking status: Former    Smokeless tobacco: Never   Substance Use Topics    Alcohol use:  Yes     Alcohol/week: 1.0 standard drink       Social History     Substance and Sexual Activity   Sexual Activity Yes    Partners: Male     OB History    Para Term  AB Living   3 3 0 0 0 0   SAB IAB Ectopic Molar Multiple Live Births   0 0 0 0 0 0      # Outcome Date GA Lbr Carrillo/2nd Weight Sex Delivery Anes PTL Lv   3 Para

## 2023-08-17 LAB
CYTOLOGIST CVX/VAG CYTO: NORMAL
CYTOLOGY CVX/VAG DOC THIN PREP: NORMAL
HPV REFLEX: NORMAL
Lab: NORMAL
PATH REPORT.FINAL DX SPEC: NORMAL
STAT OF ADQ CVX/VAG CYTO-IMP: NORMAL

## 2023-08-18 LAB
BACTERIA SPEC CULT: ABNORMAL
SERVICE CMNT-IMP: ABNORMAL

## 2023-09-06 ENCOUNTER — PATIENT MESSAGE (OUTPATIENT)
Dept: OBGYN CLINIC | Age: 63
End: 2023-09-06

## 2023-09-06 DIAGNOSIS — R30.0 DYSURIA: ICD-10-CM

## 2023-09-06 DIAGNOSIS — N30.00 ACUTE CYSTITIS WITHOUT HEMATURIA: Primary | ICD-10-CM

## 2023-09-06 NOTE — TELEPHONE ENCOUNTER
From: Raquel Olguin  To: Dr. Hughes Conception: 9/6/2023 9:59 AM EDT  Subject: UTI    I finished my antibiotics a couple of weeks ago but still not 100%. Was wondering if I should come in and take another urine test to ensure it is 100% gone. Thank you for you help!

## 2023-09-09 LAB
BACTERIA SPEC CULT: ABNORMAL
BACTERIA SPEC CULT: ABNORMAL
SERVICE CMNT-IMP: ABNORMAL

## 2023-09-11 ENCOUNTER — TELEPHONE (OUTPATIENT)
Dept: OBGYN CLINIC | Age: 63
End: 2023-09-11

## 2023-09-11 DIAGNOSIS — A49.8 E COLI INFECTION: Primary | ICD-10-CM

## 2023-09-11 RX ORDER — NITROFURANTOIN 25; 75 MG/1; MG/1
100 CAPSULE ORAL 2 TIMES DAILY
Qty: 20 CAPSULE | Refills: 0 | Status: SHIPPED | OUTPATIENT
Start: 2023-09-11 | End: 2023-09-21

## 2023-10-17 ENCOUNTER — OFFICE VISIT (OUTPATIENT)
Dept: ORTHOPEDIC SURGERY | Age: 63
End: 2023-10-17

## 2023-10-17 DIAGNOSIS — M79.672 LEFT FOOT PAIN: Primary | ICD-10-CM

## 2023-10-17 NOTE — PROGRESS NOTES
Name: Saul Oliva  YOB: 1960  Gender: female  MRN: [id number]    Summary: Bilateral hammertoes with numbness and tingling. Lidocaine gabapentin cream given. Follow-up as needed       CC: bilateral toe tightness numbness and tingling    HPI: Saul Oliva is a 58 y.o. female who has bilateral foot numbness and tingling with tightness in her toes. She also describes hammertoes. Her main issue is that wearing shoes makes her toes feel tight and she placed on the  shoes. She denies any blisters or rubbing. She is able to ambulate in these new shoes that she has. She states wearing shoes that are not as tight as helped her symptoms. She denies any swelling acute or chronic injuries. History was obtained by patient    ROS/Meds/PSH/PMH/FH/SH:   Patient Denies fever/chills, headache, visual changes, chest pain, shortness of breath, and nausea/vomiting/diarrhea     Tobacco:  reports that she has quit smoking. She has never used smokeless tobacco.  Diabetes: None  No results found for: \"LABA1C\"  Other: none    Physical Examination:  Gen: AAOx3 NAD    right lower and left lower: No Tenderness. Not able to produce any pain with palpation. +silt s/s/sp/dp/t. 5/5 strength to EHL/FHL/AT/PT/Du/Achilles. toes wwp w/ BCR <2s. no signs of acute injury. No open wounds. No pain with calf squeeze. Mild hammertoes noted in bilateral second and third toes there not severe. normal silverskoid exam: With the hindfoot in neutral and forefoot supinated there is good ankle dorsiflexion with the knee flexed and extended. Neutral hindfoot alignment. No cavovarus nor planovalgus foot deformity    Anterior drawer exam w/ ankle plantarflexed at 20 deg: normal    Neuro:  normal SILT to s/s/sp/dp/t. Reflexes normal: 1+ patella reflex bilaterally, 1+ achilles reflex bilaterally, negative babinski bilaterally.  no signs of hyper reflexia or absent reflex    Vascular: Bilateral

## 2024-02-01 ENCOUNTER — APPOINTMENT (RX ONLY)
Dept: URBAN - METROPOLITAN AREA CLINIC 24 | Facility: CLINIC | Age: 64
Setting detail: DERMATOLOGY
End: 2024-02-01

## 2024-02-01 DIAGNOSIS — L29.8 OTHER PRURITUS: ICD-10-CM | Status: INADEQUATELY CONTROLLED

## 2024-02-01 DIAGNOSIS — D22 MELANOCYTIC NEVI: ICD-10-CM

## 2024-02-01 DIAGNOSIS — L29.89 OTHER PRURITUS: ICD-10-CM | Status: INADEQUATELY CONTROLLED

## 2024-02-01 DIAGNOSIS — D485 NEOPLASM OF UNCERTAIN BEHAVIOR OF SKIN: ICD-10-CM

## 2024-02-01 DIAGNOSIS — Z71.89 OTHER SPECIFIED COUNSELING: ICD-10-CM

## 2024-02-01 DIAGNOSIS — L57.8 OTHER SKIN CHANGES DUE TO CHRONIC EXPOSURE TO NONIONIZING RADIATION: ICD-10-CM

## 2024-02-01 PROBLEM — D48.5 NEOPLASM OF UNCERTAIN BEHAVIOR OF SKIN: Status: ACTIVE | Noted: 2024-02-01

## 2024-02-01 PROBLEM — D22.5 MELANOCYTIC NEVI OF TRUNK: Status: ACTIVE | Noted: 2024-02-01

## 2024-02-01 PROBLEM — L29.9 PRURITUS, UNSPECIFIED: Status: ACTIVE | Noted: 2024-02-01

## 2024-02-01 PROCEDURE — ? PRESCRIPTION MEDICATION MANAGEMENT

## 2024-02-01 PROCEDURE — ? COUNSELING

## 2024-02-01 PROCEDURE — ? SUNSCREEN RECOMMENDATIONS

## 2024-02-01 PROCEDURE — 99214 OFFICE O/P EST MOD 30 MIN: CPT | Mod: 25

## 2024-02-01 PROCEDURE — 11102 TANGNTL BX SKIN SINGLE LES: CPT

## 2024-02-01 PROCEDURE — ? BIOPSY BY SHAVE METHOD

## 2024-02-01 ASSESSMENT — LOCATION SIMPLE DESCRIPTION DERM
LOCATION SIMPLE: RIGHT UPPER BACK
LOCATION SIMPLE: LEFT UPPER BACK
LOCATION SIMPLE: RIGHT SHOULDER

## 2024-02-01 ASSESSMENT — LOCATION DETAILED DESCRIPTION DERM
LOCATION DETAILED: RIGHT SUPERIOR UPPER BACK
LOCATION DETAILED: RIGHT POSTERIOR SHOULDER
LOCATION DETAILED: LEFT SUPERIOR LATERAL UPPER BACK
LOCATION DETAILED: LEFT MEDIAL UPPER BACK

## 2024-02-01 ASSESSMENT — LOCATION ZONE DERM
LOCATION ZONE: TRUNK
LOCATION ZONE: ARM

## 2024-02-01 ASSESSMENT — ITCH INTENSITY: HOW SEVERE IS YOUR ITCHING?: 10

## 2024-02-01 NOTE — PROCEDURE: PRESCRIPTION MEDICATION MANAGEMENT
Initiate Treatment: Zyrtec 10mg bid\\nPepcid 20mg bid\\nCeraVe anti itch cream
Render In Strict Bullet Format?: No
Detail Level: Zone
Plan: She claims the itching is really bad back and shoulders. She also claims that Allegra helps. \\n\\nShe has many areas she has excoriated on her shoulders. Consider prurigo nodularis. Will see what biopsy shows.
Area M Indication Text: Tumors in this location are included in Area M (cheek, forehead, scalp, neck, jawline and pretibial skin).  Mohs surgery is indicated for tumors in these anatomic locations.

## 2024-02-01 NOTE — PROCEDURE: MIPS QUALITY
Quality 226: Preventive Care And Screening: Tobacco Use: Screening And Cessation Intervention: Patient screened for tobacco use and is an ex/non-smoker
Quality 130: Documentation Of Current Medications In The Medical Record: Current Medications Documented
Quality 486: Dermatitis - Improvement In Patient-Reported Itch Severity: Itch severity assessment score was not reduced by at least 2 points from the initial (index) score to the follow-up visit score or assessment was not completed during the follow-up encounter
Detail Level: Detailed

## 2024-02-01 NOTE — PROCEDURE: BIOPSY BY SHAVE METHOD
Detail Level: Detailed
Depth Of Biopsy: dermis
Was A Bandage Applied: Yes
Size Of Lesion In Cm: 0
Biopsy Type: H and E
Biopsy Method: double edge Personna blade
Anesthesia Type: 1% lidocaine without epinephrine and a 1:12 solution of 8.4% sodium bicarbonate
Anesthesia Volume In Cc: 0.5
Hemostasis: Aluminum Chloride
Wound Care: Petrolatum
Dressing: bandage
Destruction After The Procedure: No
Type Of Destruction Used: Curettage
Curettage Text: The wound bed was treated with curettage after the biopsy was performed.
Cryotherapy Text: The wound bed was treated with cryotherapy after the biopsy was performed.
Electrodesiccation Text: The wound bed was treated with electrodesiccation after the biopsy was performed.
Electrodesiccation And Curettage Text: The wound bed was treated with electrodesiccation and curettage after the biopsy was performed.
Silver Nitrate Text: The wound bed was treated with silver nitrate after the biopsy was performed.
Lab: [id number]
Lab Facility: [id number]
Consent: Written consent was obtained and risks were reviewed including but not limited to scarring, infection, bleeding, scabbing, incomplete removal, nerve damage and allergy to anesthesia.
Post-Care Instructions: I reviewed with the patient in detail post-care instructions. Patient is to keep the biopsy site dry overnight, and then apply bacitracin twice daily until healed. Patient may apply hydrogen peroxide soaks to remove any crusting.
Notification Instructions: Patient will be notified of biopsy results. However, patient instructed to call the office if not contacted within 2 weeks.
Billing Type: Third-Party Bill
Information: Selecting Yes will display possible errors in your note based on the variables you have selected. This validation is only offered as a suggestion for you. PLEASE NOTE THAT THE VALIDATION TEXT WILL BE REMOVED WHEN YOU FINALIZE YOUR NOTE. IF YOU WANT TO FAX A PRELIMINARY NOTE YOU WILL NEED TO TOGGLE THIS TO 'NO' IF YOU DO NOT WANT IT IN YOUR FAXED NOTE.

## 2024-07-20 ENCOUNTER — HOSPITAL ENCOUNTER (OUTPATIENT)
Dept: MAMMOGRAPHY | Age: 64
End: 2024-07-20
Payer: COMMERCIAL

## 2024-07-20 VITALS — BODY MASS INDEX: 24.24 KG/M2 | HEIGHT: 64 IN | WEIGHT: 142 LBS

## 2024-07-20 DIAGNOSIS — Z12.31 ENCOUNTER FOR SCREENING MAMMOGRAM FOR BREAST CANCER: ICD-10-CM

## 2024-07-20 PROCEDURE — 77063 BREAST TOMOSYNTHESIS BI: CPT

## 2024-10-01 NOTE — PROGRESS NOTES
Substance Use Topics    Alcohol use: Yes     Alcohol/week: 2.0 standard drinks of alcohol     Types: 2 Glasses of wine per week       Social History     Substance and Sexual Activity   Sexual Activity Yes    Partners: Male     OB History    Para Term  AB Living   3 3 3 0 0 0   SAB IAB Ectopic Molar Multiple Live Births   0 0 0 0 0 4      # Outcome Date GA Lbr Carrillo/2nd Weight Sex Type Anes PTL Lv   3 Term            2 Term            1 Term                Health Maintenance  Mammogram: 23  Colonoscopy:   Bone Density:  Pap smear: 8/15/23      Review of Systems  General: Not Present- Fatigue, Insomnia, Hot flashes/Night sweats, Weight gain, Brain fog  Breast: Not Present- Breast Mass, Breast Pain, Breast Swelling, Nipple Discharge, Nipple Pain, Recent Breast Size Changes and Skin Changes.  Gastrointestinal: Not Present- Abdominal Pain,  Bloating, Constipation, Diarrhea, Nausea, Rectal bleeding  Female Genitourinary: Not Present- Dysmenorrhea, Dyspareunia, Decreased libido, Excessive Menstrual Bleeding, Menstrual Irregularities, Pelvic Pain, Urinary Complaints, Vaginal Discharge, Vaginal itching/burning, Vaginal odor, Vaginal dryness  Psychiatric: Not Present- Anxiety, Depression, Mood changes and Panic Attacks.         PHYSICAL EXAM:     /82   Ht 1.6 m (5' 3\")   Wt 63 kg (139 lb)   BMI 24.62 kg/m²       General   Mental Status - Well groomed; well nourished.  Oriented x 3.  Appropriate mood and affect    Integumentary   No rashes;  no suspicious lesions.     Head and Neck  Head - no lesions or palpable masses.   Neck -  normal   Thyroid - normal size and consistency;  no palpable nodules.      Chest and Lung Exam   Chest and lung exam - normal breath sounds    Cardiovascular   Cardiovascular examination  - normal heart sounds, regular rate and rhythm with no murmurs.     Breast  Left - Normal.  Right - Normal.     Abdomen   Inspection: - Normal.   Palpation/Percussion -

## 2024-10-07 SDOH — ECONOMIC STABILITY: FOOD INSECURITY: WITHIN THE PAST 12 MONTHS, YOU WORRIED THAT YOUR FOOD WOULD RUN OUT BEFORE YOU GOT MONEY TO BUY MORE.: NEVER TRUE

## 2024-10-07 SDOH — ECONOMIC STABILITY: INCOME INSECURITY: HOW HARD IS IT FOR YOU TO PAY FOR THE VERY BASICS LIKE FOOD, HOUSING, MEDICAL CARE, AND HEATING?: NOT HARD AT ALL

## 2024-10-07 SDOH — ECONOMIC STABILITY: FOOD INSECURITY: WITHIN THE PAST 12 MONTHS, THE FOOD YOU BOUGHT JUST DIDN'T LAST AND YOU DIDN'T HAVE MONEY TO GET MORE.: NEVER TRUE

## 2024-10-08 ENCOUNTER — OFFICE VISIT (OUTPATIENT)
Dept: OBGYN CLINIC | Age: 64
End: 2024-10-08
Payer: COMMERCIAL

## 2024-10-08 VITALS
HEIGHT: 63 IN | DIASTOLIC BLOOD PRESSURE: 82 MMHG | WEIGHT: 139 LBS | SYSTOLIC BLOOD PRESSURE: 122 MMHG | BODY MASS INDEX: 24.63 KG/M2

## 2024-10-08 DIAGNOSIS — Z01.419 ENCOUNTER FOR WELL WOMAN EXAM WITH ROUTINE GYNECOLOGICAL EXAM: Primary | ICD-10-CM

## 2024-10-08 DIAGNOSIS — Z12.4 CERVICAL CANCER SCREENING: ICD-10-CM

## 2024-10-08 PROCEDURE — 99397 PER PM REEVAL EST PAT 65+ YR: CPT | Performed by: OBSTETRICS & GYNECOLOGY

## 2024-10-08 PROCEDURE — 99459 PELVIC EXAMINATION: CPT | Performed by: OBSTETRICS & GYNECOLOGY

## 2024-10-08 PROCEDURE — 3074F SYST BP LT 130 MM HG: CPT | Performed by: OBSTETRICS & GYNECOLOGY

## 2024-10-08 PROCEDURE — 3079F DIAST BP 80-89 MM HG: CPT | Performed by: OBSTETRICS & GYNECOLOGY

## 2024-10-08 RX ORDER — NYSTATIN 100000 [USP'U]/ML
500000 SUSPENSION ORAL 4 TIMES DAILY
COMMUNITY
Start: 2024-10-07 | End: 2024-10-17

## 2024-10-08 RX ORDER — NYSTATIN 100000 U/G
CREAM TOPICAL
COMMUNITY
Start: 2024-09-17

## 2024-10-08 RX ORDER — FLUCONAZOLE 150 MG/1
TABLET ORAL
COMMUNITY
Start: 2024-09-25

## 2024-10-08 RX ORDER — EZETIMIBE 10 MG/1
10 TABLET ORAL DAILY
COMMUNITY

## 2024-10-11 LAB
COLLECTION METHOD: NORMAL
CYTOLOGIST CVX/VAG CYTO: NORMAL
CYTOLOGY CVX/VAG DOC THIN PREP: NORMAL
HPV REFLEX: NORMAL
Lab: NORMAL
OTHER PT INFO: NORMAL
PAP SOURCE: NORMAL
PATH REPORT.FINAL DX SPEC: NORMAL
PREV TREATMENT: NORMAL
STAT OF ADQ CVX/VAG CYTO-IMP: NORMAL

## 2024-11-07 ENCOUNTER — APPOINTMENT (RX ONLY)
Dept: URBAN - METROPOLITAN AREA CLINIC 24 | Facility: CLINIC | Age: 64
Setting detail: DERMATOLOGY
End: 2024-11-07

## 2024-11-07 DIAGNOSIS — L82.1 OTHER SEBORRHEIC KERATOSIS: ICD-10-CM

## 2024-11-07 DIAGNOSIS — D485 NEOPLASM OF UNCERTAIN BEHAVIOR OF SKIN: ICD-10-CM

## 2024-11-07 PROBLEM — D48.5 NEOPLASM OF UNCERTAIN BEHAVIOR OF SKIN: Status: ACTIVE | Noted: 2024-11-07

## 2024-11-07 PROCEDURE — 11102 TANGNTL BX SKIN SINGLE LES: CPT

## 2024-11-07 PROCEDURE — 99212 OFFICE O/P EST SF 10 MIN: CPT | Mod: 25

## 2024-11-07 PROCEDURE — ? BIOPSY BY SHAVE METHOD

## 2024-11-07 PROCEDURE — ? COUNSELING

## 2024-11-07 PROCEDURE — 11103 TANGNTL BX SKIN EA SEP/ADDL: CPT

## 2024-11-07 ASSESSMENT — LOCATION ZONE DERM
LOCATION ZONE: ARM
LOCATION ZONE: TRUNK

## 2024-11-07 ASSESSMENT — LOCATION SIMPLE DESCRIPTION DERM
LOCATION SIMPLE: LEFT UPPER BACK
LOCATION SIMPLE: LEFT UPPER ARM
LOCATION SIMPLE: RIGHT UPPER ARM

## 2024-11-07 ASSESSMENT — LOCATION DETAILED DESCRIPTION DERM
LOCATION DETAILED: LEFT MID-UPPER BACK
LOCATION DETAILED: RIGHT LATERAL DISTAL UPPER ARM
LOCATION DETAILED: LEFT LATERAL PROXIMAL UPPER ARM

## 2024-11-07 NOTE — PROCEDURE: BIOPSY BY SHAVE METHOD
Detail Level: Detailed
Depth Of Biopsy: dermis
Was A Bandage Applied: Yes
Size Of Lesion In Cm: 0
Biopsy Type: H and E
Biopsy Method: double edge Personna blade
Anesthesia Type: 1% lidocaine without epinephrine and a 1:12 solution of 8.4% sodium bicarbonate
Anesthesia Volume In Cc: 0.5
Hemostasis: Aluminum Chloride
Wound Care: Petrolatum
Dressing: bandage
Destruction After The Procedure: No
Type Of Destruction Used: Curettage
Curettage Text: The wound bed was treated with curettage after the biopsy was performed.
Cryotherapy Text: The wound bed was treated with cryotherapy after the biopsy was performed.
Electrodesiccation Text: The wound bed was treated with electrodesiccation after the biopsy was performed.
Electrodesiccation And Curettage Text: The wound bed was treated with electrodesiccation and curettage after the biopsy was performed.
Silver Nitrate Text: The wound bed was treated with silver nitrate after the biopsy was performed.
Lab: [id number]
Lab Facility: [id number]
Consent: Written consent was obtained and risks were reviewed including but not limited to scarring, infection, bleeding, scabbing, incomplete removal, nerve damage and allergy to anesthesia.
Post-Care Instructions: I reviewed with the patient in detail post-care instructions. Patient is to keep the biopsy site dry overnight, and then apply bacitracin twice daily until healed. Patient may apply hydrogen peroxide soaks to remove any crusting.
Notification Instructions: Patient will be notified of biopsy results. However, patient instructed to call the office if not contacted within 2 weeks.
Billing Type: Third-Party Bill
Information: Selecting Yes will display possible errors in your note based on the variables you have selected. This validation is only offered as a suggestion for you. PLEASE NOTE THAT THE VALIDATION TEXT WILL BE REMOVED WHEN YOU FINALIZE YOUR NOTE. IF YOU WANT TO FAX A PRELIMINARY NOTE YOU WILL NEED TO TOGGLE THIS TO 'NO' IF YOU DO NOT WANT IT IN YOUR FAXED NOTE.

## 2024-12-02 ENCOUNTER — APPOINTMENT (RX ONLY)
Dept: URBAN - METROPOLITAN AREA CLINIC 24 | Facility: CLINIC | Age: 64
Setting detail: DERMATOLOGY
End: 2024-12-02

## 2024-12-02 DIAGNOSIS — B07.8 OTHER VIRAL WARTS: ICD-10-CM

## 2024-12-02 PROBLEM — C44.629 SQUAMOUS CELL CARCINOMA OF SKIN OF LEFT UPPER LIMB, INCLUDING SHOULDER: Status: ACTIVE | Noted: 2024-12-02

## 2024-12-02 PROCEDURE — ? EXCISION

## 2024-12-02 PROCEDURE — 11602 EXC TR-EXT MAL+MARG 1.1-2 CM: CPT

## 2024-12-02 PROCEDURE — A4550 SURGICAL TRAYS: HCPCS

## 2024-12-02 PROCEDURE — 13121 CMPLX RPR S/A/L 2.6-7.5 CM: CPT | Mod: 59

## 2024-12-02 PROCEDURE — ? LIQUID NITROGEN

## 2024-12-02 PROCEDURE — 17110 DESTRUCTION B9 LES UP TO 14: CPT

## 2024-12-02 ASSESSMENT — LOCATION DETAILED DESCRIPTION DERM: LOCATION DETAILED: RIGHT PROXIMAL POSTERIOR UPPER ARM

## 2024-12-02 ASSESSMENT — LOCATION ZONE DERM: LOCATION ZONE: ARM

## 2024-12-02 ASSESSMENT — LOCATION SIMPLE DESCRIPTION DERM: LOCATION SIMPLE: RIGHT POSTERIOR UPPER ARM

## 2024-12-02 NOTE — PROCEDURE: EXCISION
Size Of Lesion In Cm: 0.7
X Size Of Lesion In Cm (Optional): 0.9
Size Of Margin In Cm: 0.3
Anesthesia Volume In Cc: 9
Was An Eye Clamp Used?: No
Eye Clamp Note Details: An eye clamp was used during the procedure.
Excision Method: Elliptical
Saucerization Depth: dermis and superficial adipose tissue
Repair Type: Complex
Intermediate / Complex Repair - Final Wound Length In Cm: 4
Suturegard Retention Suture: 2-0 Nylon
Retention Suture Bite Size: 3 mm
Length To Time In Minutes Device Was In Place: 10
Number Of Hemigard Strips Per Side: 1
Complex Requirements: Extensive Undermining Performed?: Yes
Width Of Defect Perpendicular To Closure In Cm (Required): 1.3
Distance Of Undermining In Cm (Required): 1.5
Undermining Type: Entire Wound
Debridement Text: The wound edges were debrided prior to proceeding with the closure to facilitate wound healing.
Helical Rim Text: The closure involved the helical rim.
Vermilion Border Text: The closure involved the vermilion border.
Nostril Rim Text: The closure involved the nostril rim.
Retention Suture Text: Retention sutures were placed to support the closure and prevent dehiscence.
Primary Defect Length (In Cm): 0
Lab: [id number]
Lab Facility: [id number]
Graft Donor Site Bandage (Optional-Leave Blank If You Don't Want In Note): Steri-strips and a pressure bandage were applied to the donor site.
Epidermal Closure Graft Donor Site (Optional): simple interrupted
Billing Type: Third-Party Bill
Excision Depth: adipose tissue
Scalpel Size: 15 blade
Anesthesia Type: 1% lidocaine with epinephrine
Additional Anesthesia Volume In Cc: 6
Hemostasis: Electrocautery
Estimated Blood Loss (Cc): minimal
Detail Level: Detailed
Repair Depth: use same depth as excision depth
Deep Sutures: 3-0 PDS
Epidermal Sutures: 4-0 Plain Gut
Epidermal Closure: running
Wound Care: Petrolatum
Dressing: dry sterile dressing
Suturegard Intro: Intraoperative tissue expansion was performed, utilizing the SUTUREGARD device, in order to reduce wound tension.
Suturegard Body: The suture ends were repeatedly re-tightened and re-clamped to achieve the desired tissue expansion.
Hemigard Intro: Due to skin fragility and wound tension, it was decided to use HEMIGARD adhesive retention suture devices to permit a linear closure. The skin was cleaned and dried for a 6cm distance away from the wound. Excessive hair, if present, was removed to allow for adhesion.
Hemigard Postcare Instructions: The HEMIGARD strips are to remain completely dry for at least 5-7 days.
Positioning (Leave Blank If You Do Not Want): The patient was placed in a comfortable position exposing the surgical site.
Pre-Excision Curettage Text (Leave Blank If You Do Not Want): Prior to drawing the surgical margin the visible lesion was removed with electrodesiccation and curettage to clearly define the lesion size.
Complex Repair Preamble Text (Leave Blank If You Do Not Want): Extensive wide undermining was performed.
Intermediate Repair Preamble Text (Leave Blank If You Do Not Want): Undermining was performed with blunt dissection.
Curvilinear Excision Additional Text (Leave Blank If You Do Not Want): The margin was drawn around the clinically apparent lesion.  A curvilinear shape was then drawn on the skin incorporating the lesion and margins.  Incisions were then made along these lines to the appropriate tissue plane and the lesion was extirpated.
Fusiform Excision Additional Text (Leave Blank If You Do Not Want): The margin was drawn around the clinically apparent lesion.  A fusiform shape was then drawn on the skin incorporating the lesion and margins.  Incisions were then made along these lines to the appropriate tissue plane and the lesion was extirpated.
Elliptical Excision Additional Text (Leave Blank If You Do Not Want): The margin was drawn around the clinically apparent lesion.  An elliptical shape was then drawn on the skin incorporating the lesion and margins.  Incisions were then made along these lines to the appropriate tissue plane and the lesion was extirpated.
Saucerization Excision Additional Text (Leave Blank If You Do Not Want): The margin was drawn around the clinically apparent lesion.  Incisions were then made along these lines, in a tangential fashion, to the appropriate tissue plane and the lesion was extirpated.
Slit Excision Additional Text (Leave Blank If You Do Not Want): A linear line was drawn on the skin overlying the lesion. An incision was made slowly until the lesion was visualized.  Once visualized, the lesion was removed with blunt dissection.
Excisional Biopsy Additional Text (Leave Blank If You Do Not Want): The margin was drawn around the clinically apparent lesion. An elliptical shape was then drawn on the skin incorporating the lesion and margins.  Incisions were then made along these lines to the appropriate tissue plane and the lesion was extirpated.
Perilesional Excision Additional Text (Leave Blank If You Do Not Want): The margin was drawn around the clinically apparent lesion. Incisions were then made along these lines to the appropriate tissue plane and the lesion was extirpated.
Repair Performed By Another Provider Text (Leave Blank If You Do Not Want): After the tissue was excised the defect was repaired by another provider.
No Repair - Repaired With Adjacent Surgical Defect Text (Leave Blank If You Do Not Want): After the excision the defect was repaired concurrently with another surgical defect which was in close approximation.
Adjacent Tissue Transfer Text: The defect edges were debeveled with a #15 scalpel blade.  Given the location of the defect and the proximity to free margins an adjacent tissue transfer was deemed most appropriate.  Using a sterile surgical marker, an appropriate flap was drawn incorporating the defect and placing the expected incisions within the relaxed skin tension lines where possible.    The area thus outlined was incised deep to adipose tissue with a #15 scalpel blade.  The skin margins were undermined to an appropriate distance in all directions utilizing iris scissors.
Advancement Flap (Single) Text: The defect edges were debeveled with a #15 scalpel blade.  Given the location of the defect and the proximity to free margins a single advancement flap was deemed most appropriate.  Using a sterile surgical marker, an appropriate advancement flap was drawn incorporating the defect and placing the expected incisions within the relaxed skin tension lines where possible.    The area thus outlined was incised deep to adipose tissue with a #15 scalpel blade.  The skin margins were undermined to an appropriate distance in all directions utilizing iris scissors.
Advancement Flap (Double) Text: The defect edges were debeveled with a #15 scalpel blade.  Given the location of the defect and the proximity to free margins a double advancement flap was deemed most appropriate.  Using a sterile surgical marker, the appropriate advancement flaps were drawn incorporating the defect and placing the expected incisions within the relaxed skin tension lines where possible.    The area thus outlined was incised deep to adipose tissue with a #15 scalpel blade.  The skin margins were undermined to an appropriate distance in all directions utilizing iris scissors.
Burow's Advancement Flap Text: The defect edges were debeveled with a #15 scalpel blade.  Given the location of the defect and the proximity to free margins a Burow's advancement flap was deemed most appropriate.  Using a sterile surgical marker, the appropriate advancement flap was drawn incorporating the defect and placing the expected incisions within the relaxed skin tension lines where possible.    The area thus outlined was incised deep to adipose tissue with a #15 scalpel blade.  The skin margins were undermined to an appropriate distance in all directions utilizing iris scissors.
Chonodrocutaneous Helical Advancement Flap Text: The defect edges were debeveled with a #15 scalpel blade.  Given the location of the defect and the proximity to free margins a chondrocutaneous helical advancement flap was deemed most appropriate.  Using a sterile surgical marker, the appropriate advancement flap was drawn incorporating the defect and placing the expected incisions within the relaxed skin tension lines where possible.    The area thus outlined was incised deep to adipose tissue with a #15 scalpel blade.  The skin margins were undermined to an appropriate distance in all directions utilizing iris scissors.
Crescentic Advancement Flap Text: The defect edges were debeveled with a #15 scalpel blade.  Given the location of the defect and the proximity to free margins a crescentic advancement flap was deemed most appropriate.  Using a sterile surgical marker, the appropriate advancement flap was drawn incorporating the defect and placing the expected incisions within the relaxed skin tension lines where possible.    The area thus outlined was incised deep to adipose tissue with a #15 scalpel blade.  The skin margins were undermined to an appropriate distance in all directions utilizing iris scissors.
A-T Advancement Flap Text: The defect edges were debeveled with a #15 scalpel blade.  Given the location of the defect, shape of the defect and the proximity to free margins an A-T advancement flap was deemed most appropriate.  Using a sterile surgical marker, an appropriate advancement flap was drawn incorporating the defect and placing the expected incisions within the relaxed skin tension lines where possible.    The area thus outlined was incised deep to adipose tissue with a #15 scalpel blade.  The skin margins were undermined to an appropriate distance in all directions utilizing iris scissors.
O-T Advancement Flap Text: The defect edges were debeveled with a #15 scalpel blade.  Given the location of the defect, shape of the defect and the proximity to free margins an O-T advancement flap was deemed most appropriate.  Using a sterile surgical marker, an appropriate advancement flap was drawn incorporating the defect and placing the expected incisions within the relaxed skin tension lines where possible.    The area thus outlined was incised deep to adipose tissue with a #15 scalpel blade.  The skin margins were undermined to an appropriate distance in all directions utilizing iris scissors.
O-L Flap Text: The defect edges were debeveled with a #15 scalpel blade.  Given the location of the defect, shape of the defect and the proximity to free margins an O-L flap was deemed most appropriate.  Using a sterile surgical marker, an appropriate advancement flap was drawn incorporating the defect and placing the expected incisions within the relaxed skin tension lines where possible.    The area thus outlined was incised deep to adipose tissue with a #15 scalpel blade.  The skin margins were undermined to an appropriate distance in all directions utilizing iris scissors.
O-Z Flap Text: The defect edges were debeveled with a #15 scalpel blade.  Given the location of the defect, shape of the defect and the proximity to free margins an O-Z flap was deemed most appropriate.  Using a sterile surgical marker, an appropriate transposition flap was drawn incorporating the defect and placing the expected incisions within the relaxed skin tension lines where possible. The area thus outlined was incised deep to adipose tissue with a #15 scalpel blade.  The skin margins were undermined to an appropriate distance in all directions utilizing iris scissors.
Double O-Z Flap Text: The defect edges were debeveled with a #15 scalpel blade.  Given the location of the defect, shape of the defect and the proximity to free margins a Double O-Z flap was deemed most appropriate.  Using a sterile surgical marker, an appropriate transposition flap was drawn incorporating the defect and placing the expected incisions within the relaxed skin tension lines where possible. The area thus outlined was incised deep to adipose tissue with a #15 scalpel blade.  The skin margins were undermined to an appropriate distance in all directions utilizing iris scissors.
V-Y Flap Text: The defect edges were debeveled with a #15 scalpel blade.  Given the location of the defect, shape of the defect and the proximity to free margins a V-Y flap was deemed most appropriate.  Using a sterile surgical marker, an appropriate advancement flap was drawn incorporating the defect and placing the expected incisions within the relaxed skin tension lines where possible.    The area thus outlined was incised deep to adipose tissue with a #15 scalpel blade.  The skin margins were undermined to an appropriate distance in all directions utilizing iris scissors.
Advancement-Rotation Flap Text: The defect edges were debeveled with a #15 scalpel blade.  Given the location of the defect, shape of the defect and the proximity to free margins an advancement-rotation flap was deemed most appropriate.  Using a sterile surgical marker, an appropriate flap was drawn incorporating the defect and placing the expected incisions within the relaxed skin tension lines where possible. The area thus outlined was incised deep to adipose tissue with a #15 scalpel blade.  The skin margins were undermined to an appropriate distance in all directions utilizing iris scissors.
Mercedes Flap Text: The defect edges were debeveled with a #15 scalpel blade.  Given the location of the defect, shape of the defect and the proximity to free margins a Mercedes flap was deemed most appropriate.  Using a sterile surgical marker, an appropriate advancement flap was drawn incorporating the defect and placing the expected incisions within the relaxed skin tension lines where possible. The area thus outlined was incised deep to adipose tissue with a #15 scalpel blade.  The skin margins were undermined to an appropriate distance in all directions utilizing iris scissors.
Modified Advancement Flap Text: The defect edges were debeveled with a #15 scalpel blade.  Given the location of the defect, shape of the defect and the proximity to free margins a modified advancement flap was deemed most appropriate.  Using a sterile surgical marker, an appropriate advancement flap was drawn incorporating the defect and placing the expected incisions within the relaxed skin tension lines where possible.    The area thus outlined was incised deep to adipose tissue with a #15 scalpel blade.  The skin margins were undermined to an appropriate distance in all directions utilizing iris scissors.
Mucosal Advancement Flap Text: Given the location of the defect, shape of the defect and the proximity to free margins a mucosal advancement flap was deemed most appropriate. Incisions were made with a 15 blade scalpel in the appropriate fashion along the cutaneous vermilion border and the mucosal lip. The remaining actinically damaged mucosal tissue was excised.  The mucosal advancement flap was then elevated to the gingival sulcus with care taken to preserve the neurovascular structures and advanced into the primary defect. Care was taken to ensure that precise realignment of the vermilion border was achieved.
Peng Advancement Flap Text: The defect edges were debeveled with a #15 scalpel blade.  Given the location of the defect, shape of the defect and the proximity to free margins a Peng advancement flap was deemed most appropriate.  Using a sterile surgical marker, an appropriate advancement flap was drawn incorporating the defect and placing the expected incisions within the relaxed skin tension lines where possible. The area thus outlined was incised deep to adipose tissue with a #15 scalpel blade.  The skin margins were undermined to an appropriate distance in all directions utilizing iris scissors.
Hatchet Flap Text: The defect edges were debeveled with a #15 scalpel blade.  Given the location of the defect, shape of the defect and the proximity to free margins a hatchet flap was deemed most appropriate.  Using a sterile surgical marker, an appropriate hatchet flap was drawn incorporating the defect and placing the expected incisions within the relaxed skin tension lines where possible.    The area thus outlined was incised deep to adipose tissue with a #15 scalpel blade.  The skin margins were undermined to an appropriate distance in all directions utilizing iris scissors.
Rotation Flap Text: The defect edges were debeveled with a #15 scalpel blade.  Given the location of the defect, shape of the defect and the proximity to free margins a rotation flap was deemed most appropriate.  Using a sterile surgical marker, an appropriate rotation flap was drawn incorporating the defect and placing the expected incisions within the relaxed skin tension lines where possible.    The area thus outlined was incised deep to adipose tissue with a #15 scalpel blade.  The skin margins were undermined to an appropriate distance in all directions utilizing iris scissors.
Bilateral Rotation Flap Text: The defect edges were debeveled with a #15 scalpel blade. Given the location of the defect, shape of the defect and the proximity to free margins a bilateral rotation flap was deemed most appropriate. Using a sterile surgical marker, an appropriate rotation flap was drawn incorporating the defect and placing the expected incisions within the relaxed skin tension lines where possible. The area thus outlined was incised deep to adipose tissue with a #15 scalpel blade. The skin margins were undermined to an appropriate distance in all directions utilizing iris scissors. Following this, the designed flap was carried over into the primary defect and sutured into place.
Spiral Flap Text: The defect edges were debeveled with a #15 scalpel blade.  Given the location of the defect, shape of the defect and the proximity to free margins a spiral flap was deemed most appropriate.  Using a sterile surgical marker, an appropriate rotation flap was drawn incorporating the defect and placing the expected incisions within the relaxed skin tension lines where possible. The area thus outlined was incised deep to adipose tissue with a #15 scalpel blade.  The skin margins were undermined to an appropriate distance in all directions utilizing iris scissors.
Staged Advancement Flap Text: The defect edges were debeveled with a #15 scalpel blade.  Given the location of the defect, shape of the defect and the proximity to free margins a staged advancement flap was deemed most appropriate.  Using a sterile surgical marker, an appropriate advancement flap was drawn incorporating the defect and placing the expected incisions within the relaxed skin tension lines where possible. The area thus outlined was incised deep to adipose tissue with a #15 scalpel blade.  The skin margins were undermined to an appropriate distance in all directions utilizing iris scissors.
Star Wedge Flap Text: The defect edges were debeveled with a #15 scalpel blade.  Given the location of the defect, shape of the defect and the proximity to free margins a star wedge flap was deemed most appropriate.  Using a sterile surgical marker, an appropriate rotation flap was drawn incorporating the defect and placing the expected incisions within the relaxed skin tension lines where possible. The area thus outlined was incised deep to adipose tissue with a #15 scalpel blade.  The skin margins were undermined to an appropriate distance in all directions utilizing iris scissors.
Transposition Flap Text: The defect edges were debeveled with a #15 scalpel blade.  Given the location of the defect and the proximity to free margins a transposition flap was deemed most appropriate.  Using a sterile surgical marker, an appropriate transposition flap was drawn incorporating the defect.    The area thus outlined was incised deep to adipose tissue with a #15 scalpel blade.  The skin margins were undermined to an appropriate distance in all directions utilizing iris scissors.
Muscle Hinge Flap Text: The defect edges were debeveled with a #15 scalpel blade.  Given the size, depth and location of the defect and the proximity to free margins a muscle hinge flap was deemed most appropriate.  Using a sterile surgical marker, an appropriate hinge flap was drawn incorporating the defect. The area thus outlined was incised with a #15 scalpel blade.  The skin margins were undermined to an appropriate distance in all directions utilizing iris scissors.
Mustarde Flap Text: The defect edges were debeveled with a #15 scalpel blade.  Given the size, depth and location of the defect and the proximity to free margins a Mustarde flap was deemed most appropriate.  Using a sterile surgical marker, an appropriate flap was drawn incorporating the defect. The area thus outlined was incised with a #15 scalpel blade.  The skin margins were undermined to an appropriate distance in all directions utilizing iris scissors.
Nasal Turnover Hinge Flap Text: The defect edges were debeveled with a #15 scalpel blade.  Given the size, depth, location of the defect and the defect being full thickness a nasal turnover hinge flap was deemed most appropriate.  Using a sterile surgical marker, an appropriate hinge flap was drawn incorporating the defect. The area thus outlined was incised with a #15 scalpel blade. The flap was designed to recreate the nasal mucosal lining and the alar rim. The skin margins were undermined to an appropriate distance in all directions utilizing iris scissors.
Nasalis-Muscle-Based Myocutaneous Island Pedicle Flap Text: Using a #15 blade, an incision was made around the donor flap to the level of the nasalis muscle. Wide lateral undermining was then performed in both the subcutaneous plane above the nasalis muscle, and in a submuscular plane just above periosteum. This allowed the formation of a free nasalis muscle axial pedicle (based on the angular artery) which was still attached to the actual cutaneous flap, increasing its mobility and vascular viability. Hemostasis was obtained with pinpoint electrocoagulation. The flap was mobilized into position and the pivotal anchor points positioned and stabilized with buried interrupted sutures. Subcutaneous and dermal tissues were closed in a multilayered fashion with sutures. Tissue redundancies were excised, and the epidermal edges were apposed without significant tension and sutured with sutures.
Nasalis Myocutaneous Flap Text: Using a #15 blade, an incision was made around the donor flap to the level of the nasalis muscle. Wide lateral undermining was then performed in both the subcutaneous plane above the nasalis muscle, and in a submuscular plane just above periosteum. This allowed the formation of a free nasalis muscle axial pedicle which was still attached to the actual cutaneous flap, increasing its mobility and vascular viability. Hemostasis was obtained with pinpoint electrocoagulation. The flap was mobilized into position and the pivotal anchor points positioned and stabilized with buried interrupted sutures. Subcutaneous and dermal tissues were closed in a multilayered fashion with sutures. Tissue redundancies were excised, and the epidermal edges were apposed without significant tension and sutured with sutures.
Nasolabial Transposition Flap Text: The defect edges were debeveled with a #15 scalpel blade.  Given the size, depth and location of the defect and the proximity to free margins a nasolabial transposition flap was deemed most appropriate. Using a sterile surgical marker, an appropriate flap was drawn incorporating the defect. The area thus outlined was incised with a #15 scalpel blade. The skin margins were undermined to an appropriate distance in all directions utilizing iris scissors. Following this, the designed flap was carried into the primary defect and sutured into place.
Orbicularis Oris Muscle Flap Text: The defect edges were debeveled with a #15 scalpel blade.  Given that the defect affected the competency of the oral sphincter an orbicularis oris muscle flap was deemed most appropriate to restore this competency and normal muscle function.  Using a sterile surgical marker, an appropriate flap was drawn incorporating the defect. The area thus outlined was incised with a #15 scalpel blade.
Melolabial Transposition Flap Text: The defect edges were debeveled with a #15 scalpel blade.  Given the location of the defect and the proximity to free margins a melolabial flap was deemed most appropriate.  Using a sterile surgical marker, an appropriate melolabial transposition flap was drawn incorporating the defect.    The area thus outlined was incised deep to adipose tissue with a #15 scalpel blade.  The skin margins were undermined to an appropriate distance in all directions utilizing iris scissors.
Rectangular Flap Text: The defect edges were debeveled with a #15 scalpel blade. Given the location of the defect and the proximity to free margins a rectangular flap was deemed most appropriate. Using a sterile surgical marker, an appropriate rectangular flap was drawn incorporating the defect. The area thus outlined was incised deep to adipose tissue with a #15 scalpel blade. The skin margins were undermined to an appropriate distance in all directions utilizing iris scissors. Following this, the designed flap was carried over into the primary defect and sutured into place.
Rhombic Flap Text: The defect edges were debeveled with a #15 scalpel blade.  Given the location of the defect and the proximity to free margins a rhombic flap was deemed most appropriate.  Using a sterile surgical marker, an appropriate rhombic flap was drawn incorporating the defect.    The area thus outlined was incised deep to adipose tissue with a #15 scalpel blade.  The skin margins were undermined to an appropriate distance in all directions utilizing iris scissors.
Rhomboid Transposition Flap Text: The defect edges were debeveled with a #15 scalpel blade.  Given the location of the defect and the proximity to free margins a rhomboid transposition flap was deemed most appropriate.  Using a sterile surgical marker, an appropriate rhomboid flap was drawn incorporating the defect.    The area thus outlined was incised deep to adipose tissue with a #15 scalpel blade.  The skin margins were undermined to an appropriate distance in all directions utilizing iris scissors.
Bi-Rhombic Flap Text: The defect edges were debeveled with a #15 scalpel blade.  Given the location of the defect and the proximity to free margins a bi-rhombic flap was deemed most appropriate.  Using a sterile surgical marker, an appropriate rhombic flap was drawn incorporating the defect. The area thus outlined was incised deep to adipose tissue with a #15 scalpel blade.  The skin margins were undermined to an appropriate distance in all directions utilizing iris scissors.
Helical Rim Advancement Flap Text: The defect edges were debeveled with a #15 blade scalpel.  Given the location of the defect and the proximity to free margins (helical rim) a double helical rim advancement flap was deemed most appropriate.  Using a sterile surgical marker, the appropriate advancement flaps were drawn incorporating the defect and placing the expected incisions between the helical rim and antihelix where possible.  The area thus outlined was incised through and through with a #15 scalpel blade.  With a skin hook and iris scissors, the flaps were gently and sharply undermined and freed up.
Bilateral Helical Rim Advancement Flap Text: The defect edges were debeveled with a #15 blade scalpel.  Given the location of the defect and the proximity to free margins (helical rim) a bilateral helical rim advancement flap was deemed most appropriate.  Using a sterile surgical marker, the appropriate advancement flaps were drawn incorporating the defect and placing the expected incisions between the helical rim and antihelix where possible.  The area thus outlined was incised through and through with a #15 scalpel blade.  With a skin hook and iris scissors, the flaps were gently and sharply undermined and freed up.
Ear Star Wedge Flap Text: The defect edges were debeveled with a #15 blade scalpel.  Given the location of the defect and the proximity to free margins (helical rim) an ear star wedge flap was deemed most appropriate.  Using a sterile surgical marker, the appropriate flap was drawn incorporating the defect and placing the expected incisions between the helical rim and antihelix where possible.  The area thus outlined was incised through and through with a #15 scalpel blade.
Flip-Flop Flap Text: The defect edges were debeveled with a #15 blade scalpel.  Given the location of the defect and the proximity to free margins a flip-flop flap was deemed most appropriate. Using a sterile surgical marker, the appropriate flap was drawn incorporating the defect and placing the expected incisions between the helical rim and antihelix where possible.  The area thus outlined was incised through and through with a #15 scalpel blade. Following this, the designed flap was carried over into the primary defect and sutured into place.
Banner Transposition Flap Text: The defect edges were debeveled with a #15 scalpel blade.  Given the location of the defect and the proximity to free margins a Banner transposition flap was deemed most appropriate.  Using a sterile surgical marker, an appropriate flap drawn around the defect. The area thus outlined was incised deep to adipose tissue with a #15 scalpel blade.  The skin margins were undermined to an appropriate distance in all directions utilizing iris scissors.
Bilobed Flap Text: The defect edges were debeveled with a #15 scalpel blade.  Given the location of the defect and the proximity to free margins a bilobe flap was deemed most appropriate.  Using a sterile surgical marker, an appropriate bilobe flap drawn around the defect.    The area thus outlined was incised deep to adipose tissue with a #15 scalpel blade.  The skin margins were undermined to an appropriate distance in all directions utilizing iris scissors.
Bilobed Transposition Flap Text: The defect edges were debeveled with a #15 scalpel blade.  Given the location of the defect and the proximity to free margins a bilobed transposition flap was deemed most appropriate.  Using a sterile surgical marker, an appropriate bilobe flap drawn around the defect.    The area thus outlined was incised deep to adipose tissue with a #15 scalpel blade.  The skin margins were undermined to an appropriate distance in all directions utilizing iris scissors.
Trilobed Flap Text: The defect edges were debeveled with a #15 scalpel blade.  Given the location of the defect and the proximity to free margins a trilobed flap was deemed most appropriate.  Using a sterile surgical marker, an appropriate trilobed flap drawn around the defect.    The area thus outlined was incised deep to adipose tissue with a #15 scalpel blade.  The skin margins were undermined to an appropriate distance in all directions utilizing iris scissors.
Dorsal Nasal Flap Text: The defect edges were debeveled with a #15 scalpel blade.  Given the location of the defect and the proximity to free margins a dorsal nasal flap was deemed most appropriate.  Using a sterile surgical marker, an appropriate dorsal nasal flap was drawn around the defect.    The area thus outlined was incised deep to adipose tissue with a #15 scalpel blade.  The skin margins were undermined to an appropriate distance in all directions utilizing iris scissors.
Island Pedicle Flap Text: The defect edges were debeveled with a #15 scalpel blade.  Given the location of the defect, shape of the defect and the proximity to free margins an island pedicle advancement flap was deemed most appropriate.  Using a sterile surgical marker, an appropriate advancement flap was drawn incorporating the defect, outlining the appropriate donor tissue and placing the expected incisions within the relaxed skin tension lines where possible.    The area thus outlined was incised deep to adipose tissue with a #15 scalpel blade.  The skin margins were undermined to an appropriate distance in all directions around the primary defect and laterally outward around the island pedicle utilizing iris scissors.  There was minimal undermining beneath the pedicle flap.
Island Pedicle Flap With Canthal Suspension Text: The defect edges were debeveled with a #15 scalpel blade.  Given the location of the defect, shape of the defect and the proximity to free margins an island pedicle advancement flap was deemed most appropriate.  Using a sterile surgical marker, an appropriate advancement flap was drawn incorporating the defect, outlining the appropriate donor tissue and placing the expected incisions within the relaxed skin tension lines where possible. The area thus outlined was incised deep to adipose tissue with a #15 scalpel blade.  The skin margins were undermined to an appropriate distance in all directions around the primary defect and laterally outward around the island pedicle utilizing iris scissors.  There was minimal undermining beneath the pedicle flap. A suspension suture was placed in the canthal tendon to prevent tension and prevent ectropion.
Alar Island Pedicle Flap Text: The defect edges were debeveled with a #15 scalpel blade.  Given the location of the defect, shape of the defect and the proximity to the alar rim an island pedicle advancement flap was deemed most appropriate.  Using a sterile surgical marker, an appropriate advancement flap was drawn incorporating the defect, outlining the appropriate donor tissue and placing the expected incisions within the nasal ala running parallel to the alar rim. The area thus outlined was incised with a #15 scalpel blade.  The skin margins were undermined minimally to an appropriate distance in all directions around the primary defect and laterally outward around the island pedicle utilizing iris scissors.  There was minimal undermining beneath the pedicle flap.
Double Island Pedicle Flap Text: The defect edges were debeveled with a #15 scalpel blade.  Given the location of the defect, shape of the defect and the proximity to free margins a double island pedicle advancement flap was deemed most appropriate.  Using a sterile surgical marker, an appropriate advancement flap was drawn incorporating the defect, outlining the appropriate donor tissue and placing the expected incisions within the relaxed skin tension lines where possible.    The area thus outlined was incised deep to adipose tissue with a #15 scalpel blade.  The skin margins were undermined to an appropriate distance in all directions around the primary defect and laterally outward around the island pedicle utilizing iris scissors.  There was minimal undermining beneath the pedicle flap.
Island Pedicle Flap-Requiring Vessel Identification Text: The defect edges were debeveled with a #15 scalpel blade.  Given the location of the defect, shape of the defect and the proximity to free margins an island pedicle advancement flap was deemed most appropriate.  Using a sterile surgical marker, an appropriate advancement flap was drawn, based on the axial vessel mentioned above, incorporating the defect, outlining the appropriate donor tissue and placing the expected incisions within the relaxed skin tension lines where possible.    The area thus outlined was incised deep to adipose tissue with a #15 scalpel blade.  The skin margins were undermined to an appropriate distance in all directions around the primary defect and laterally outward around the island pedicle utilizing iris scissors.  There was minimal undermining beneath the pedicle flap.
Keystone Flap Text: The defect edges were debeveled with a #15 scalpel blade.  Given the location of the defect, shape of the defect a keystone flap was deemed most appropriate.  Using a sterile surgical marker, an appropriate keystone flap was drawn incorporating the defect, outlining the appropriate donor tissue and placing the expected incisions within the relaxed skin tension lines where possible. The area thus outlined was incised deep to adipose tissue with a #15 scalpel blade.  The skin margins were undermined to an appropriate distance in all directions around the primary defect and laterally outward around the flap utilizing iris scissors.
O-T Plasty Text: The defect edges were debeveled with a #15 scalpel blade.  Given the location of the defect, shape of the defect and the proximity to free margins an O-T plasty was deemed most appropriate.  Using a sterile surgical marker, an appropriate O-T plasty was drawn incorporating the defect and placing the expected incisions within the relaxed skin tension lines where possible.    The area thus outlined was incised deep to adipose tissue with a #15 scalpel blade.  The skin margins were undermined to an appropriate distance in all directions utilizing iris scissors.
O-Z Plasty Text: The defect edges were debeveled with a #15 scalpel blade.  Given the location of the defect, shape of the defect and the proximity to free margins an O-Z plasty (double transposition flap) was deemed most appropriate.  Using a sterile surgical marker, the appropriate transposition flaps were drawn incorporating the defect and placing the expected incisions within the relaxed skin tension lines where possible.    The area thus outlined was incised deep to adipose tissue with a #15 scalpel blade.  The skin margins were undermined to an appropriate distance in all directions utilizing iris scissors.  Hemostasis was achieved with electrocautery.  The flaps were then transposed into place, one clockwise and the other counterclockwise, and anchored with interrupted buried subcutaneous sutures.
Double O-Z Plasty Text: The defect edges were debeveled with a #15 scalpel blade.  Given the location of the defect, shape of the defect and the proximity to free margins a Double O-Z plasty (double transposition flap) was deemed most appropriate.  Using a sterile surgical marker, the appropriate transposition flaps were drawn incorporating the defect and placing the expected incisions within the relaxed skin tension lines where possible. The area thus outlined was incised deep to adipose tissue with a #15 scalpel blade.  The skin margins were undermined to an appropriate distance in all directions utilizing iris scissors.  Hemostasis was achieved with electrocautery.  The flaps were then transposed into place, one clockwise and the other counterclockwise, and anchored with interrupted buried subcutaneous sutures.
V-Y Plasty Text: The defect edges were debeveled with a #15 scalpel blade.  Given the location of the defect, shape of the defect and the proximity to free margins an V-Y advancement flap was deemed most appropriate.  Using a sterile surgical marker, an appropriate advancement flap was drawn incorporating the defect and placing the expected incisions within the relaxed skin tension lines where possible.    The area thus outlined was incised deep to adipose tissue with a #15 scalpel blade.  The skin margins were undermined to an appropriate distance in all directions utilizing iris scissors.
H Plasty Text: Given the location of the defect, shape of the defect and the proximity to free margins a H-plasty was deemed most appropriate for repair.  Using a sterile surgical marker, the appropriate advancement arms of the H-plasty were drawn incorporating the defect and placing the expected incisions within the relaxed skin tension lines where possible. The area thus outlined was incised deep to adipose tissue with a #15 scalpel blade. The skin margins were undermined to an appropriate distance in all directions utilizing iris scissors.  The opposing advancement arms were then advanced into place in opposite direction and anchored with interrupted buried subcutaneous sutures.
W Plasty Text: The lesion was extirpated to the level of the fat with a #15 scalpel blade.  Given the location of the defect, shape of the defect and the proximity to free margins a W-plasty was deemed most appropriate for repair.  Using a sterile surgical marker, the appropriate transposition arms of the W-plasty were drawn incorporating the defect and placing the expected incisions within the relaxed skin tension lines where possible.    The area thus outlined was incised deep to adipose tissue with a #15 scalpel blade.  The skin margins were undermined to an appropriate distance in all directions utilizing iris scissors.  The opposing transposition arms were then transposed into place in opposite direction and anchored with interrupted buried subcutaneous sutures.
Z Plasty Text: The lesion was extirpated to the level of the fat with a #15 scalpel blade.  Given the location of the defect, shape of the defect and the proximity to free margins a Z-plasty was deemed most appropriate for repair.  Using a sterile surgical marker, the appropriate transposition arms of the Z-plasty were drawn incorporating the defect and placing the expected incisions within the relaxed skin tension lines where possible.    The area thus outlined was incised deep to adipose tissue with a #15 scalpel blade.  The skin margins were undermined to an appropriate distance in all directions utilizing iris scissors.  The opposing transposition arms were then transposed into place in opposite direction and anchored with interrupted buried subcutaneous sutures.
Double Z Plasty Text: The lesion was extirpated to the level of the fat with a #15 scalpel blade. Given the location of the defect, shape of the defect and the proximity to free margins a double Z-plasty was deemed most appropriate for repair. Using a sterile surgical marker, the appropriate transposition arms of the double Z-plasty were drawn incorporating the defect and placing the expected incisions within the relaxed skin tension lines where possible. The area thus outlined was incised deep to adipose tissue with a #15 scalpel blade. The skin margins were undermined to an appropriate distance in all directions utilizing iris scissors. The opposing transposition arms were then transposed and carried over into place in opposite direction and anchored with interrupted buried subcutaneous sutures.
Zygomaticofacial Flap Text: Given the location of the defect, shape of the defect and the proximity to free margins a zygomaticofacial flap was deemed most appropriate for repair.  Using a sterile surgical marker, the appropriate flap was drawn incorporating the defect and placing the expected incisions within the relaxed skin tension lines where possible. The area thus outlined was incised deep to adipose tissue with a #15 scalpel blade with preservation of a vascular pedicle.  The skin margins were undermined to an appropriate distance in all directions utilizing iris scissors.  The flap was then placed into the defect and anchored with interrupted buried subcutaneous sutures.
Cheek Interpolation Flap Text: A decision was made to reconstruct the defect utilizing an interpolation axial flap and a staged reconstruction.  A telfa template was made of the defect.  This telfa template was then used to outline the Cheek Interpolation flap.  The donor area for the pedicle flap was then injected with anesthesia.  The flap was excised through the skin and subcutaneous tissue down to the layer of the underlying musculature.  The interpolation flap was carefully excised within this deep plane to maintain its blood supply.  The edges of the donor site were undermined.   The donor site was closed in a primary fashion.  The pedicle was then rotated into position and sutured.  Once the tube was sutured into place, adequate blood supply was confirmed with blanching and refill.  The pedicle was then wrapped with xeroform gauze and dressed appropriately with a telfa and gauze bandage to ensure continued blood supply and protect the attached pedicle.
Cheek-To-Nose Interpolation Flap Text: A decision was made to reconstruct the defect utilizing an interpolation axial flap and a staged reconstruction.  A telfa template was made of the defect.  This telfa template was then used to outline the Cheek-To-Nose Interpolation flap.  The donor area for the pedicle flap was then injected with anesthesia.  The flap was excised through the skin and subcutaneous tissue down to the layer of the underlying musculature.  The interpolation flap was carefully excised within this deep plane to maintain its blood supply.  The edges of the donor site were undermined.   The donor site was closed in a primary fashion.  The pedicle was then rotated into position and sutured.  Once the tube was sutured into place, adequate blood supply was confirmed with blanching and refill.  The pedicle was then wrapped with xeroform gauze and dressed appropriately with a telfa and gauze bandage to ensure continued blood supply and protect the attached pedicle.
Interpolation Flap Text: A decision was made to reconstruct the defect utilizing an interpolation axial flap and a staged reconstruction.  A telfa template was made of the defect.  This telfa template was then used to outline the interpolation flap.  The donor area for the pedicle flap was then injected with anesthesia.  The flap was excised through the skin and subcutaneous tissue down to the layer of the underlying musculature.  The interpolation flap was carefully excised within this deep plane to maintain its blood supply.  The edges of the donor site were undermined.   The donor site was closed in a primary fashion.  The pedicle was then rotated into position and sutured.  Once the tube was sutured into place, adequate blood supply was confirmed with blanching and refill.  The pedicle was then wrapped with xeroform gauze and dressed appropriately with a telfa and gauze bandage to ensure continued blood supply and protect the attached pedicle.
Melolabial Interpolation Flap Text: A decision was made to reconstruct the defect utilizing an interpolation axial flap and a staged reconstruction.  A telfa template was made of the defect.  This telfa template was then used to outline the melolabial interpolation flap.  The donor area for the pedicle flap was then injected with anesthesia.  The flap was excised through the skin and subcutaneous tissue down to the layer of the underlying musculature.  The pedicle flap was carefully excised within this deep plane to maintain its blood supply.  The edges of the donor site were undermined.   The donor site was closed in a primary fashion.  The pedicle was then rotated into position and sutured.  Once the tube was sutured into place, adequate blood supply was confirmed with blanching and refill.  The pedicle was then wrapped with xeroform gauze and dressed appropriately with a telfa and gauze bandage to ensure continued blood supply and protect the attached pedicle.
Mastoid Interpolation Flap Text: A decision was made to reconstruct the defect utilizing an interpolation axial flap and a staged reconstruction.  A telfa template was made of the defect.  This telfa template was then used to outline the mastoid interpolation flap.  The donor area for the pedicle flap was then injected with anesthesia.  The flap was excised through the skin and subcutaneous tissue down to the layer of the underlying musculature.  The pedicle flap was carefully excised within this deep plane to maintain its blood supply.  The edges of the donor site were undermined.   The donor site was closed in a primary fashion.  The pedicle was then rotated into position and sutured.  Once the tube was sutured into place, adequate blood supply was confirmed with blanching and refill.  The pedicle was then wrapped with xeroform gauze and dressed appropriately with a telfa and gauze bandage to ensure continued blood supply and protect the attached pedicle.
Posterior Auricular Interpolation Flap Text: A decision was made to reconstruct the defect utilizing an interpolation axial flap and a staged reconstruction.  A telfa template was made of the defect.  This telfa template was then used to outline the posterior auricular interpolation flap.  The donor area for the pedicle flap was then injected with anesthesia.  The flap was excised through the skin and subcutaneous tissue down to the layer of the underlying musculature.  The pedicle flap was carefully excised within this deep plane to maintain its blood supply.  The edges of the donor site were undermined.   The donor site was closed in a primary fashion.  The pedicle was then rotated into position and sutured.  Once the tube was sutured into place, adequate blood supply was confirmed with blanching and refill.  The pedicle was then wrapped with xeroform gauze and dressed appropriately with a telfa and gauze bandage to ensure continued blood supply and protect the attached pedicle.
Paramedian Forehead Flap Text: A decision was made to reconstruct the defect utilizing an interpolation axial flap and a staged reconstruction.  A telfa template was made of the defect.  This telfa template was then used to outline the paramedian forehead pedicle flap.  The donor area for the pedicle flap was then injected with anesthesia.  The flap was excised through the skin and subcutaneous tissue down to the layer of the underlying musculature.  The pedicle flap was carefully excised within this deep plane to maintain its blood supply.  The edges of the donor site were undermined.   The donor site was closed in a primary fashion.  The pedicle was then rotated into position and sutured.  Once the tube was sutured into place, adequate blood supply was confirmed with blanching and refill.  The pedicle was then wrapped with xeroform gauze and dressed appropriately with a telfa and gauze bandage to ensure continued blood supply and protect the attached pedicle.
Abbe Flap (Upper To Lower Lip) Text: The defect of the lower lip was assessed and measured.  Given the location and size of the defect, an Abbe flap was deemed most appropriate.  Using a sterile surgical marker, an appropriate Abbe flap was measured and drawn on the upper lip. Local anesthesia was then infiltrated.  A scalpel was then used to incise the upper lip through and through the skin, vermilion, muscle and mucosa, leaving the flap pedicled on the opposite side.  The flap was then rotated and transferred to the lower lip defect.  The flap was then sutured into place with a three layer technique, closing the orbicularis oris muscle layer with subcutaneous buried sutures, followed by a mucosal layer and an epidermal layer.
Abbe Flap (Lower To Upper Lip) Text: The defect of the upper lip was assessed and measured.  Given the location and size of the defect, an Abbe flap was deemed most appropriate.  Using a sterile surgical marker, an appropriate Abbe flap was measured and drawn on the lower lip. Local anesthesia was then infiltrated. A scalpel was then used to incise the upper lip through and through the skin, vermilion, muscle and mucosa, leaving the flap pedicled on the opposite side.  The flap was then rotated and transferred to the lower lip defect.  The flap was then sutured into place with a three layer technique, closing the orbicularis oris muscle layer with subcutaneous buried sutures, followed by a mucosal layer and an epidermal layer.
Estlander Flap (Upper To Lower Lip) Text: The defect of the lower lip was assessed and measured.  Given the location and size of the defect, an Estlander flap was deemed most appropriate.  Using a sterile surgical marker, an appropriate Estlander flap was measured and drawn on the upper lip. Local anesthesia was then infiltrated. A scalpel was then used to incise the lateral aspect of the flap, through skin, muscle and mucosa, leaving the flap pedicled medially.  The flap was then rotated and positioned to fill the lower lip defect.  The flap was then sutured into place with a three layer technique, closing the orbicularis oris muscle layer with subcutaneous buried sutures, followed by a mucosal layer and an epidermal layer.
Lip Wedge Excision Repair Text: Given the location of the defect and the proximity to free margins a full thickness wedge repair was deemed most appropriate.  Using a sterile surgical marker, the appropriate repair was drawn incorporating the defect and placing the expected incisions perpendicular to the vermilion border.  The vermilion border was also meticulously outlined to ensure appropriate reapproximation during the repair.  The area thus outlined was incised through and through with a #15 scalpel blade.  The muscularis and dermis were reaproximated with deep sutures following hemostasis. Care was taken to realign the vermilion border before proceeding with the superficial closure.  Once the vermilion was realigned the superfical and mucosal closure was finished.
Ftsg Text: The defect edges were debeveled with a #15 scalpel blade.  Given the location of the defect, shape of the defect and the proximity to free margins a full thickness skin graft was deemed most appropriate.  Using a sterile surgical marker, the primary defect shape was transferred to the donor site. The area thus outlined was incised deep to adipose tissue with a #15 scalpel blade.  The harvested graft was then trimmed of adipose tissue until only dermis and epidermis was left.  The skin margins of the secondary defect were undermined to an appropriate distance in all directions utilizing iris scissors.  The secondary defect was closed with interrupted buried subcutaneous sutures.  The skin edges were then re-apposed with running  sutures.  The skin graft was then placed in the primary defect and oriented appropriately.
Split-Thickness Skin Graft Text: The defect edges were debeveled with a #15 scalpel blade.  Given the location of the defect, shape of the defect and the proximity to free margins a split thickness skin graft was deemed most appropriate.  Using a sterile surgical marker, the primary defect shape was transferred to the donor site. The split thickness graft was then harvested.  The skin graft was then placed in the primary defect and oriented appropriately.
Pinch Graft Text: The defect edges were debeveled with a #15 scalpel blade. Given the location of the defect, shape of the defect and the proximity to free margins a pinch graft was deemed most appropriate. Using a sterile surgical marker, the primary defect shape was transferred to the donor site. The area thus outlined was incised deep to adipose tissue with a #15 scalpel blade.  The harvested graft was then trimmed of adipose tissue until only dermis and epidermis was left. The skin margins of the secondary defect were undermined to an appropriate distance in all directions utilizing iris scissors.  The secondary defect was closed with interrupted buried subcutaneous sutures.  The skin edges were then re-apposed with running  sutures.  The skin graft was then placed in the primary defect and oriented appropriately.
Burow's Graft Text: The defect edges were debeveled with a #15 scalpel blade.  Given the location of the defect, shape of the defect, the proximity to free margins and the presence of a standing cone deformity a Burow's skin graft was deemed most appropriate. The standing cone was removed and this tissue was then trimmed to the shape of the primary defect. The adipose tissue was also removed until only dermis and epidermis were left.  The skin margins of the secondary defect were undermined to an appropriate distance in all directions utilizing iris scissors.  The secondary defect was closed with interrupted buried subcutaneous sutures.  The skin edges were then re-apposed with running  sutures.  The skin graft was then placed in the primary defect and oriented appropriately.
Cartilage Graft Text: The defect edges were debeveled with a #15 scalpel blade.  Given the location of the defect, shape of the defect, the fact the defect involved a full thickness cartilage defect a cartilage graft was deemed most appropriate.  An appropriate donor site was identified, cleansed, and anesthetized. The cartilage graft was then harvested and transferred to the recipient site, oriented appropriately and then sutured into place.  The secondary defect was then repaired using a primary closure.
Composite Graft Text: The defect edges were debeveled with a #15 scalpel blade.  Given the location of the defect, shape of the defect, the proximity to free margins and the fact the defect was full thickness a composite graft was deemed most appropriate.  The defect was outline and then transferred to the donor site.  A full thickness graft was then excised from the donor site. The graft was then placed in the primary defect, oriented appropriately and then sutured into place.  The secondary defect was then repaired using a primary closure.
Epidermal Autograft Text: The defect edges were debeveled with a #15 scalpel blade.  Given the location of the defect, shape of the defect and the proximity to free margins an epidermal autograft was deemed most appropriate.  Using a sterile surgical marker, the primary defect shape was transferred to the donor site. The epidermal graft was then harvested.  The skin graft was then placed in the primary defect and oriented appropriately.
Dermal Autograft Text: The defect edges were debeveled with a #15 scalpel blade.  Given the location of the defect, shape of the defect and the proximity to free margins a dermal autograft was deemed most appropriate.  Using a sterile surgical marker, the primary defect shape was transferred to the donor site. The area thus outlined was incised deep to adipose tissue with a #15 scalpel blade.  The harvested graft was then trimmed of adipose and epidermal tissue until only dermis was left.  The skin graft was then placed in the primary defect and oriented appropriately.
Skin Substitute Text: The defect edges were debeveled with a #15 scalpel blade.  Given the location of the defect, shape of the defect and the proximity to free margins a skin substitute graft was deemed most appropriate.  The graft material was trimmed to fit the size of the defect. The graft was then placed in the primary defect and oriented appropriately.
Tissue Cultured Epidermal Autograft Text: The defect edges were debeveled with a #15 scalpel blade.  Given the location of the defect, shape of the defect and the proximity to free margins a tissue cultured epidermal autograft was deemed most appropriate.  The graft was then trimmed to fit the size of the defect.  The graft was then placed in the primary defect and oriented appropriately.
Xenograft Text: The defect edges were debeveled with a #15 scalpel blade.  Given the location of the defect, shape of the defect and the proximity to free margins a xenograft was deemed most appropriate.  The graft was then trimmed to fit the size of the defect.  The graft was then placed in the primary defect and oriented appropriately.
Purse String (Intermediate) Text: Given the location of the defect and the characteristics of the surrounding skin a purse string intermediate closure was deemed most appropriate.  Undermining was performed circumferentially around the surgical defect.  A purse string suture was then placed and tightened.
Purse String (Simple) Text: Given the location of the defect and the characteristics of the surrounding skin a purse string simple closure was deemed most appropriate.  Undermining was performed circumferentially around the surgical defect.  A purse string suture was then placed and tightened.
Partial Purse String (Intermediate) Text: Given the location of the defect and the characteristics of the surrounding skin an intermediate purse string closure was deemed most appropriate.  Undermining was performed circumferentially around the surgical defect.  A purse string suture was then placed and tightened. Wound tension of the circular defect prevented complete closure of the wound.
Partial Purse String (Simple) Text: Given the location of the defect and the characteristics of the surrounding skin a simple purse string closure was deemed most appropriate.  Undermining was performed circumferentially around the surgical defect.  A purse string suture was then placed and tightened. Wound tension of the circular defect prevented complete closure of the wound.
Complex Repair And Single Advancement Flap Text: The defect edges were debeveled with a #15 scalpel blade.  The primary defect was closed partially with a complex linear closure.  Given the location of the remaining defect, shape of the defect and the proximity to free margins a single advancement flap was deemed most appropriate for complete closure of the defect.  Using a sterile surgical marker, an appropriate advancement flap was drawn incorporating the defect and placing the expected incisions within the relaxed skin tension lines where possible.    The area thus outlined was incised deep to adipose tissue with a #15 scalpel blade.  The skin margins were undermined to an appropriate distance in all directions utilizing iris scissors.
Complex Repair And Double Advancement Flap Text: The defect edges were debeveled with a #15 scalpel blade.  The primary defect was closed partially with a complex linear closure.  Given the location of the remaining defect, shape of the defect and the proximity to free margins a double advancement flap was deemed most appropriate for complete closure of the defect.  Using a sterile surgical marker, an appropriate advancement flap was drawn incorporating the defect and placing the expected incisions within the relaxed skin tension lines where possible.    The area thus outlined was incised deep to adipose tissue with a #15 scalpel blade.  The skin margins were undermined to an appropriate distance in all directions utilizing iris scissors.
Complex Repair And Modified Advancement Flap Text: The defect edges were debeveled with a #15 scalpel blade.  The primary defect was closed partially with a complex linear closure.  Given the location of the remaining defect, shape of the defect and the proximity to free margins a modified advancement flap was deemed most appropriate for complete closure of the defect.  Using a sterile surgical marker, an appropriate advancement flap was drawn incorporating the defect and placing the expected incisions within the relaxed skin tension lines where possible.    The area thus outlined was incised deep to adipose tissue with a #15 scalpel blade.  The skin margins were undermined to an appropriate distance in all directions utilizing iris scissors.
Complex Repair And A-T Advancement Flap Text: The defect edges were debeveled with a #15 scalpel blade.  The primary defect was closed partially with a complex linear closure.  Given the location of the remaining defect, shape of the defect and the proximity to free margins an A-T advancement flap was deemed most appropriate for complete closure of the defect.  Using a sterile surgical marker, an appropriate advancement flap was drawn incorporating the defect and placing the expected incisions within the relaxed skin tension lines where possible.    The area thus outlined was incised deep to adipose tissue with a #15 scalpel blade.  The skin margins were undermined to an appropriate distance in all directions utilizing iris scissors.
Complex Repair And O-T Advancement Flap Text: The defect edges were debeveled with a #15 scalpel blade.  The primary defect was closed partially with a complex linear closure.  Given the location of the remaining defect, shape of the defect and the proximity to free margins an O-T advancement flap was deemed most appropriate for complete closure of the defect.  Using a sterile surgical marker, an appropriate advancement flap was drawn incorporating the defect and placing the expected incisions within the relaxed skin tension lines where possible.    The area thus outlined was incised deep to adipose tissue with a #15 scalpel blade.  The skin margins were undermined to an appropriate distance in all directions utilizing iris scissors.
Complex Repair And O-L Flap Text: The defect edges were debeveled with a #15 scalpel blade.  The primary defect was closed partially with a complex linear closure.  Given the location of the remaining defect, shape of the defect and the proximity to free margins an O-L flap was deemed most appropriate for complete closure of the defect.  Using a sterile surgical marker, an appropriate flap was drawn incorporating the defect and placing the expected incisions within the relaxed skin tension lines where possible.    The area thus outlined was incised deep to adipose tissue with a #15 scalpel blade.  The skin margins were undermined to an appropriate distance in all directions utilizing iris scissors.
Complex Repair And Bilobe Flap Text: The defect edges were debeveled with a #15 scalpel blade.  The primary defect was closed partially with a complex linear closure.  Given the location of the remaining defect, shape of the defect and the proximity to free margins a bilobe flap was deemed most appropriate for complete closure of the defect.  Using a sterile surgical marker, an appropriate advancement flap was drawn incorporating the defect and placing the expected incisions within the relaxed skin tension lines where possible.    The area thus outlined was incised deep to adipose tissue with a #15 scalpel blade.  The skin margins were undermined to an appropriate distance in all directions utilizing iris scissors.
Complex Repair And Melolabial Flap Text: The defect edges were debeveled with a #15 scalpel blade.  The primary defect was closed partially with a complex linear closure.  Given the location of the remaining defect, shape of the defect and the proximity to free margins a melolabial flap was deemed most appropriate for complete closure of the defect.  Using a sterile surgical marker, an appropriate advancement flap was drawn incorporating the defect and placing the expected incisions within the relaxed skin tension lines where possible.    The area thus outlined was incised deep to adipose tissue with a #15 scalpel blade.  The skin margins were undermined to an appropriate distance in all directions utilizing iris scissors.
Complex Repair And Rotation Flap Text: The defect edges were debeveled with a #15 scalpel blade.  The primary defect was closed partially with a complex linear closure.  Given the location of the remaining defect, shape of the defect and the proximity to free margins a rotation flap was deemed most appropriate for complete closure of the defect.  Using a sterile surgical marker, an appropriate advancement flap was drawn incorporating the defect and placing the expected incisions within the relaxed skin tension lines where possible.    The area thus outlined was incised deep to adipose tissue with a #15 scalpel blade.  The skin margins were undermined to an appropriate distance in all directions utilizing iris scissors.
Complex Repair And Rhombic Flap Text: The defect edges were debeveled with a #15 scalpel blade.  The primary defect was closed partially with a complex linear closure.  Given the location of the remaining defect, shape of the defect and the proximity to free margins a rhombic flap was deemed most appropriate for complete closure of the defect.  Using a sterile surgical marker, an appropriate advancement flap was drawn incorporating the defect and placing the expected incisions within the relaxed skin tension lines where possible.    The area thus outlined was incised deep to adipose tissue with a #15 scalpel blade.  The skin margins were undermined to an appropriate distance in all directions utilizing iris scissors.
Complex Repair And Transposition Flap Text: The defect edges were debeveled with a #15 scalpel blade.  The primary defect was closed partially with a complex linear closure.  Given the location of the remaining defect, shape of the defect and the proximity to free margins a transposition flap was deemed most appropriate for complete closure of the defect.  Using a sterile surgical marker, an appropriate advancement flap was drawn incorporating the defect and placing the expected incisions within the relaxed skin tension lines where possible.    The area thus outlined was incised deep to adipose tissue with a #15 scalpel blade.  The skin margins were undermined to an appropriate distance in all directions utilizing iris scissors.
Complex Repair And V-Y Plasty Text: The defect edges were debeveled with a #15 scalpel blade.  The primary defect was closed partially with a complex linear closure.  Given the location of the remaining defect, shape of the defect and the proximity to free margins a V-Y plasty was deemed most appropriate for complete closure of the defect.  Using a sterile surgical marker, an appropriate advancement flap was drawn incorporating the defect and placing the expected incisions within the relaxed skin tension lines where possible.    The area thus outlined was incised deep to adipose tissue with a #15 scalpel blade.  The skin margins were undermined to an appropriate distance in all directions utilizing iris scissors.
Complex Repair And M Plasty Text: The defect edges were debeveled with a #15 scalpel blade.  The primary defect was closed partially with a complex linear closure.  Given the location of the remaining defect, shape of the defect and the proximity to free margins an M plasty was deemed most appropriate for complete closure of the defect.  Using a sterile surgical marker, an appropriate advancement flap was drawn incorporating the defect and placing the expected incisions within the relaxed skin tension lines where possible.    The area thus outlined was incised deep to adipose tissue with a #15 scalpel blade.  The skin margins were undermined to an appropriate distance in all directions utilizing iris scissors.
Complex Repair And Double M Plasty Text: The defect edges were debeveled with a #15 scalpel blade.  The primary defect was closed partially with a complex linear closure.  Given the location of the remaining defect, shape of the defect and the proximity to free margins a double M plasty was deemed most appropriate for complete closure of the defect.  Using a sterile surgical marker, an appropriate advancement flap was drawn incorporating the defect and placing the expected incisions within the relaxed skin tension lines where possible.    The area thus outlined was incised deep to adipose tissue with a #15 scalpel blade.  The skin margins were undermined to an appropriate distance in all directions utilizing iris scissors.
Complex Repair And W Plasty Text: The defect edges were debeveled with a #15 scalpel blade.  The primary defect was closed partially with a complex linear closure.  Given the location of the remaining defect, shape of the defect and the proximity to free margins a W plasty was deemed most appropriate for complete closure of the defect.  Using a sterile surgical marker, an appropriate advancement flap was drawn incorporating the defect and placing the expected incisions within the relaxed skin tension lines where possible.    The area thus outlined was incised deep to adipose tissue with a #15 scalpel blade.  The skin margins were undermined to an appropriate distance in all directions utilizing iris scissors.
Complex Repair And Z Plasty Text: The defect edges were debeveled with a #15 scalpel blade.  The primary defect was closed partially with a complex linear closure.  Given the location of the remaining defect, shape of the defect and the proximity to free margins a Z plasty was deemed most appropriate for complete closure of the defect.  Using a sterile surgical marker, an appropriate advancement flap was drawn incorporating the defect and placing the expected incisions within the relaxed skin tension lines where possible.    The area thus outlined was incised deep to adipose tissue with a #15 scalpel blade.  The skin margins were undermined to an appropriate distance in all directions utilizing iris scissors.
Complex Repair And Dorsal Nasal Flap Text: The defect edges were debeveled with a #15 scalpel blade.  The primary defect was closed partially with a complex linear closure.  Given the location of the remaining defect, shape of the defect and the proximity to free margins a dorsal nasal flap was deemed most appropriate for complete closure of the defect.  Using a sterile surgical marker, an appropriate flap was drawn incorporating the defect and placing the expected incisions within the relaxed skin tension lines where possible.    The area thus outlined was incised deep to adipose tissue with a #15 scalpel blade.  The skin margins were undermined to an appropriate distance in all directions utilizing iris scissors.
Complex Repair And Ftsg Text: The defect edges were debeveled with a #15 scalpel blade.  The primary defect was closed partially with a complex linear closure.  Given the location of the defect, shape of the defect and the proximity to free margins a full thickness skin graft was deemed most appropriate to repair the remaining defect.  The graft was trimmed to fit the size of the remaining defect.  The graft was then placed in the primary defect, oriented appropriately, and sutured into place.
Complex Repair And Burow's Graft Text: The defect edges were debeveled with a #15 scalpel blade.  The primary defect was closed partially with a complex linear closure.  Given the location of the defect, shape of the defect, the proximity to free margins and the presence of a standing cone deformity a Burow's graft was deemed most appropriate to repair the remaining defect.  The graft was trimmed to fit the size of the remaining defect.  The graft was then placed in the primary defect, oriented appropriately, and sutured into place.
Complex Repair And Split-Thickness Skin Graft Text: The defect edges were debeveled with a #15 scalpel blade.  The primary defect was closed partially with a complex linear closure.  Given the location of the defect, shape of the defect and the proximity to free margins a split thickness skin graft was deemed most appropriate to repair the remaining defect.  The graft was trimmed to fit the size of the remaining defect.  The graft was then placed in the primary defect, oriented appropriately, and sutured into place.
Complex Repair And Epidermal Autograft Text: The defect edges were debeveled with a #15 scalpel blade.  The primary defect was closed partially with a complex linear closure.  Given the location of the defect, shape of the defect and the proximity to free margins an epidermal autograft was deemed most appropriate to repair the remaining defect.  The graft was trimmed to fit the size of the remaining defect.  The graft was then placed in the primary defect, oriented appropriately, and sutured into place.
Complex Repair And Dermal Autograft Text: The defect edges were debeveled with a #15 scalpel blade.  The primary defect was closed partially with a complex linear closure.  Given the location of the defect, shape of the defect and the proximity to free margins an dermal autograft was deemed most appropriate to repair the remaining defect.  The graft was trimmed to fit the size of the remaining defect.  The graft was then placed in the primary defect, oriented appropriately, and sutured into place.
Complex Repair And Tissue Cultured Epidermal Autograft Text: The defect edges were debeveled with a #15 scalpel blade.  The primary defect was closed partially with a complex linear closure.  Given the location of the defect, shape of the defect and the proximity to free margins an tissue cultured epidermal autograft was deemed most appropriate to repair the remaining defect.  The graft was trimmed to fit the size of the remaining defect.  The graft was then placed in the primary defect, oriented appropriately, and sutured into place.
Complex Repair And Xenograft Text: The defect edges were debeveled with a #15 scalpel blade.  The primary defect was closed partially with a complex linear closure.  Given the location of the defect, shape of the defect and the proximity to free margins a xenograft was deemed most appropriate to repair the remaining defect.  The graft was trimmed to fit the size of the remaining defect.  The graft was then placed in the primary defect, oriented appropriately, and sutured into place.
Complex Repair And Skin Substitute Graft Text: The defect edges were debeveled with a #15 scalpel blade.  The primary defect was closed partially with a complex linear closure.  Given the location of the remaining defect, shape of the defect and the proximity to free margins a skin substitute graft was deemed most appropriate to repair the remaining defect.  The graft was trimmed to fit the size of the remaining defect.  The graft was then placed in the primary defect, oriented appropriately, and sutured into place.
Include Anticoagulation In Mohs Note?: Please Select the Appropriate Response
Path Notes (To The Dermatopathologist): Please check margins.
Consent was obtained from the patient. The risks and benefits to therapy were discussed in detail. Specifically, the risks of infection, scarring, bleeding, prolonged wound healing, incomplete removal, allergy to anesthesia, nerve injury and recurrence were addressed. Prior to the procedure, the treatment site was clearly identified and confirmed by the patient. All components of Universal Protocol/PAUSE Rule completed.
Post-Care Instructions: I reviewed with the patient in detail post-care instructions. Patient is not to engage in any heavy lifting, exercise, or swimming for the next 14 days. Should the patient develop any fevers, chills, bleeding, severe pain patient will contact the office immediately.
Home Suture Removal Text: Patient was provided a home suture removal kit and will remove their sutures at home.  If they have any questions or difficulties they will call the office.
Where Do You Want The Question To Include Opioid Counseling Located?: Case Summary Tab
Information: Selecting Yes will display possible errors in your note based on the variables you have selected. This validation is only offered as a suggestion for you. PLEASE NOTE THAT THE VALIDATION TEXT WILL BE REMOVED WHEN YOU FINALIZE YOUR NOTE. IF YOU WANT TO FAX A PRELIMINARY NOTE YOU WILL NEED TO TOGGLE THIS TO 'NO' IF YOU DO NOT WANT IT IN YOUR FAXED NOTE.

## 2024-12-02 NOTE — PROCEDURE: LIQUID NITROGEN
Spray Paint Technique: No
Post-Care Instructions: I reviewed with the patient in detail post-care instructions. Patient is to wear sunprotection, and avoid picking at any of the treated lesions. Pt may apply Vaseline to crusted or scabbing areas.
Spray Paint Text: The liquid nitrogen was applied to the skin utilizing a spray paint frosting technique.
Show Topical Anesthesia Variable?: Yes
Detail Level: Detailed
Medical Necessity Clause: This procedure was medically necessary because the lesions that were treated were:
Medical Necessity Information: It is in your best interest to select a reason for this procedure from the list below. All of these items fulfill various CMS LCD requirements except the new and changing color options.
Consent: The patient's consent was obtained including but not limited to risks of crusting, scabbing, blistering, scarring, darker or lighter pigmentary change, recurrence, incomplete removal and infection.

## 2024-12-04 ENCOUNTER — TELEPHONE (OUTPATIENT)
Dept: UROGYNECOLOGY | Age: 64
End: 2024-12-04

## 2024-12-04 NOTE — TELEPHONE ENCOUNTER
Attempted to contact patient on 12/3/2024 2x in regards to her 12/4/2024 appointment.  Needed additional information regarding her visit.

## 2025-02-06 ENCOUNTER — APPOINTMENT (OUTPATIENT)
Dept: URBAN - METROPOLITAN AREA CLINIC 24 | Facility: CLINIC | Age: 65
Setting detail: DERMATOLOGY
End: 2025-02-06

## 2025-02-06 DIAGNOSIS — L57.8 OTHER SKIN CHANGES DUE TO CHRONIC EXPOSURE TO NONIONIZING RADIATION: ICD-10-CM

## 2025-02-06 DIAGNOSIS — D22 MELANOCYTIC NEVI: ICD-10-CM

## 2025-02-06 DIAGNOSIS — D485 NEOPLASM OF UNCERTAIN BEHAVIOR OF SKIN: ICD-10-CM

## 2025-02-06 DIAGNOSIS — Z85.828 PERSONAL HISTORY OF OTHER MALIGNANT NEOPLASM OF SKIN: ICD-10-CM

## 2025-02-06 DIAGNOSIS — Z71.89 OTHER SPECIFIED COUNSELING: ICD-10-CM

## 2025-02-06 PROBLEM — D22.5 MELANOCYTIC NEVI OF TRUNK: Status: ACTIVE | Noted: 2025-02-06

## 2025-02-06 PROBLEM — D48.5 NEOPLASM OF UNCERTAIN BEHAVIOR OF SKIN: Status: ACTIVE | Noted: 2025-02-06

## 2025-02-06 PROCEDURE — ? BIOPSY BY SHAVE METHOD

## 2025-02-06 PROCEDURE — 11102 TANGNTL BX SKIN SINGLE LES: CPT

## 2025-02-06 PROCEDURE — ? COUNSELING

## 2025-02-06 PROCEDURE — 99213 OFFICE O/P EST LOW 20 MIN: CPT | Mod: 25

## 2025-02-06 PROCEDURE — ? SUNSCREEN RECOMMENDATIONS

## 2025-02-06 ASSESSMENT — LOCATION SIMPLE DESCRIPTION DERM
LOCATION SIMPLE: RIGHT BACK
LOCATION SIMPLE: RIGHT UPPER BACK
LOCATION SIMPLE: LEFT UPPER BACK
LOCATION SIMPLE: LEFT UPPER ARM

## 2025-02-06 ASSESSMENT — LOCATION ZONE DERM
LOCATION ZONE: TRUNK
LOCATION ZONE: ARM

## 2025-02-06 ASSESSMENT — LOCATION DETAILED DESCRIPTION DERM
LOCATION DETAILED: RIGHT SUPERIOR UPPER BACK
LOCATION DETAILED: LEFT MEDIAL UPPER BACK
LOCATION DETAILED: LEFT LATERAL PROXIMAL UPPER ARM
LOCATION DETAILED: RIGHT SUPERIOR LATERAL UPPER BACK

## 2025-02-06 NOTE — PROCEDURE: BIOPSY BY SHAVE METHOD
Detail Level: Detailed
Depth Of Biopsy: dermis
Was A Bandage Applied: Yes
Size Of Lesion In Cm: 0
Biopsy Type: H and E
Biopsy Method: double edge Personna blade
Anesthesia Type: 1% lidocaine without epinephrine and a 1:12 solution of 8.4% sodium bicarbonate
Anesthesia Volume In Cc: 0.5
Hemostasis: Aluminum Chloride
Wound Care: Petrolatum
Dressing: bandage
Destruction After The Procedure: No
Type Of Destruction Used: Curettage
Curettage Text: The wound bed was treated with curettage after the biopsy was performed.
Cryotherapy Text: The wound bed was treated with cryotherapy after the biopsy was performed.
Electrodesiccation Text: The wound bed was treated with electrodesiccation after the biopsy was performed.
Electrodesiccation And Curettage Text: The wound bed was treated with electrodesiccation and curettage after the biopsy was performed.
Silver Nitrate Text: The wound bed was treated with silver nitrate after the biopsy was performed.
Lab: [id number]
Lab Facility: [id number]
Medical Necessity Information: It is in your best interest to select a reason for this procedure from the list below. All of these items fulfill various CMS LCD requirements except the new and changing color options.
Consent: Written consent was obtained and risks were reviewed including but not limited to scarring, infection, bleeding, scabbing, incomplete removal, nerve damage and allergy to anesthesia.
Post-Care Instructions: I reviewed with the patient in detail post-care instructions. Patient is to keep the biopsy site dry overnight, and then apply bacitracin twice daily until healed. Patient may apply hydrogen peroxide soaks to remove any crusting.
Notification Instructions: Patient will be notified of biopsy results. However, patient instructed to call the office if not contacted within 2 weeks.
Billing Type: Third-Party Bill
Information: Selecting Yes will display possible errors in your note based on the variables you have selected. This validation is only offered as a suggestion for you. PLEASE NOTE THAT THE VALIDATION TEXT WILL BE REMOVED WHEN YOU FINALIZE YOUR NOTE. IF YOU WANT TO FAX A PRELIMINARY NOTE YOU WILL NEED TO TOGGLE THIS TO 'NO' IF YOU DO NOT WANT IT IN YOUR FAXED NOTE.

## 2025-06-12 ENCOUNTER — TELEPHONE (OUTPATIENT)
Dept: OBGYN CLINIC | Age: 65
End: 2025-06-12

## 2025-06-12 NOTE — TELEPHONE ENCOUNTER
Pt called c/o cracked skin on the nipples. She denies any bleeding or drainage from nipples. She has not recently changed products that come in contact with the area. She will use cortisone cream and if no better she will call.

## (undated) DEVICE — CYSTO/BLADDER IRRIGATION SET, REGULATING CLAMP

## (undated) DEVICE — SLIM BODY SKIN STAPLER: Brand: APPOSE ULC

## (undated) DEVICE — CYSTO: Brand: MEDLINE INDUSTRIES, INC.

## (undated) DEVICE — SOLUTION IV 1000ML 0.9% SOD CHL

## (undated) DEVICE — HOOK RETRCT L5MM E SHRP SELF RET SYS LONE STAR

## (undated) DEVICE — SUTURE VCRL SZ 3-0 L18IN ABSRB UD PS-2 L19MM 1/2 CIR J497G

## (undated) DEVICE — CARDINAL HEALTH FLEXIBLE LIGHT HANDLE COVER: Brand: CARDINAL HEALTH

## (undated) DEVICE — DRAPE TWL SURG 16X26IN BLU ORB04] ALLCARE INC]

## (undated) DEVICE — LITHOTOMY: Brand: MEDLINE INDUSTRIES, INC.

## (undated) DEVICE — SOLUTION IRRIGATION H2O 0797305] ICU MEDICAL INC]

## (undated) DEVICE — MEDI-VAC NON-CONDUCTIVE SUCTION TUBING: Brand: CARDINAL HEALTH

## (undated) DEVICE — NEEDLE HYPO 18GA L1.5IN PNK S STL HUB POLYPR SHLD REG BVL

## (undated) DEVICE — 2000CC GUARDIAN II: Brand: GUARDIAN

## (undated) DEVICE — DRAPE,LITHOTOMY,STERILE: Brand: MEDLINE

## (undated) DEVICE — CATH URETH INTMIT 16FRX16IN --

## (undated) DEVICE — MEDI-VAC YANKAUER SUCTION HANDLE W/BULBOUS TIP: Brand: CARDINAL HEALTH

## (undated) DEVICE — SUTURE VCRL SZ 2-0 L27IN ABSRB VLT L36MM CT-1 1/2 CIR J339H

## (undated) DEVICE — KENDALL SCD EXPRESS SLEEVES, KNEE LENGTH, LARGE: Brand: KENDALL SCD

## (undated) DEVICE — TRAY PREP DRY W/ PREM GLV 2 APPL 6 SPNG 2 UNDPD 1 OVERWRAP

## (undated) DEVICE — BUTTON SWITCH PENCIL BLADE ELECTRODE, HOLSTER: Brand: EDGE

## (undated) DEVICE — APPLICATOR BNDG 1MM ADH PREMIERPRO EXOFIN

## (undated) DEVICE — KENDALL SCD EXPRESS SLEEVES, KNEE LENGTH, MEDIUM: Brand: KENDALL SCD

## (undated) DEVICE — SUTURE VCRL SZ 2-0 L27IN ABSRB UD L26MM CT-2 1/2 CIR J269H

## (undated) DEVICE — REM POLYHESIVE ADULT PATIENT RETURN ELECTRODE: Brand: VALLEYLAB

## (undated) DEVICE — RING RETRCTR FIG 8 32.5X18.3CM -- PLAS STRL W/2 CATH CLIPS